# Patient Record
Sex: FEMALE | URBAN - METROPOLITAN AREA
[De-identification: names, ages, dates, MRNs, and addresses within clinical notes are randomized per-mention and may not be internally consistent; named-entity substitution may affect disease eponyms.]

---

## 2024-01-29 LAB
EXTERNAL HEPATITIS B SURFACE ANTIGEN: NEGATIVE
EXTERNAL RUBELLA IGG QUANTITATION: NEGATIVE
HEPATITIS C VIRUS AB PRESENCE IN SERUM EXTERNAL: NONREACTIVE
HIV 1/ 2 AG/AB SCREEN EXTERNAL: NONREACTIVE
SYPHILIS TOTAL AB EXTERNAL: NONREACTIVE

## 2024-01-29 PROCEDURE — 86317 IMMUNOASSAY INFECTIOUS AGENT: CPT

## 2024-01-29 PROCEDURE — 87800 DETECT AGNT MULT DNA DIREC: CPT

## 2024-01-29 PROCEDURE — 87389 HIV-1 AG W/HIV-1&-2 AB AG IA: CPT

## 2024-01-29 PROCEDURE — 86803 HEPATITIS C AB TEST: CPT

## 2024-01-29 PROCEDURE — 86780 TREPONEMA PALLIDUM: CPT

## 2024-01-29 PROCEDURE — 80307 DRUG TEST PRSMV CHEM ANLYZR: CPT

## 2024-01-29 PROCEDURE — 88175 CYTOPATH C/V AUTO FLUID REDO: CPT

## 2024-01-29 PROCEDURE — 86901 BLOOD TYPING SEROLOGIC RH(D): CPT

## 2024-01-29 PROCEDURE — 87086 URINE CULTURE/COLONY COUNT: CPT

## 2024-01-29 PROCEDURE — 85027 COMPLETE CBC AUTOMATED: CPT

## 2024-01-29 PROCEDURE — 86850 RBC ANTIBODY SCREEN: CPT

## 2024-01-29 PROCEDURE — 86900 BLOOD TYPING SEROLOGIC ABO: CPT

## 2024-01-29 PROCEDURE — 87340 HEPATITIS B SURFACE AG IA: CPT

## 2024-01-30 ENCOUNTER — LAB REQUISITION (OUTPATIENT)
Dept: LAB | Facility: HOSPITAL | Age: 24
End: 2024-01-30

## 2024-01-30 DIAGNOSIS — Z3A.10 10 WEEKS GESTATION OF PREGNANCY (HHS-HCC): ICD-10-CM

## 2024-01-30 LAB
AMPHETAMINES UR QL SCN: NORMAL
BARBITURATES UR QL SCN: NORMAL
BENZODIAZ UR QL SCN: NORMAL
BZE UR QL SCN: NORMAL
CANNABINOIDS UR QL SCN: NORMAL
ERYTHROCYTE [DISTWIDTH] IN BLOOD BY AUTOMATED COUNT: 12.9 % (ref 11.5–14.5)
FENTANYL+NORFENTANYL UR QL SCN: NORMAL
HBV SURFACE AG SERPL QL IA: NONREACTIVE
HCT VFR BLD AUTO: 40.6 % (ref 36–46)
HCV AB SER QL: NONREACTIVE
HGB BLD-MCNC: 13.4 G/DL (ref 12–16)
HIV 1+2 AB+HIV1 P24 AG SERPL QL IA: NONREACTIVE
MCH RBC QN AUTO: 28.8 PG (ref 26–34)
MCHC RBC AUTO-ENTMCNC: 33 G/DL (ref 32–36)
MCV RBC AUTO: 87 FL (ref 80–100)
NRBC BLD-RTO: 0 /100 WBCS (ref 0–0)
OPIATES UR QL SCN: NORMAL
OXYCODONE+OXYMORPHONE UR QL SCN: NORMAL
PCP UR QL SCN: NORMAL
PLATELET # BLD AUTO: 269 X10*3/UL (ref 150–450)
RBC # BLD AUTO: 4.65 X10*6/UL (ref 4–5.2)
TREPONEMA PALLIDUM IGG+IGM AB [PRESENCE] IN SERUM OR PLASMA BY IMMUNOASSAY: NONREACTIVE
WBC # BLD AUTO: 9.5 X10*3/UL (ref 4.4–11.3)

## 2024-01-31 ENCOUNTER — LAB REQUISITION (OUTPATIENT)
Dept: LAB | Facility: HOSPITAL | Age: 24
End: 2024-01-31

## 2024-01-31 DIAGNOSIS — Z12.4 ENCOUNTER FOR SCREENING FOR MALIGNANT NEOPLASM OF CERVIX: ICD-10-CM

## 2024-01-31 LAB
ABO GROUP (TYPE) IN BLOOD: NORMAL
ANTIBODY SCREEN: NORMAL
BACTERIA UR CULT: ABNORMAL
C TRACH RRNA SPEC QL NAA+PROBE: NEGATIVE
N GONORRHOEA DNA SPEC QL PROBE+SIG AMP: NEGATIVE
REFLEX ADDED, ANEMIA PANEL: NORMAL
RH FACTOR (ANTIGEN D): NORMAL
RUBV IGG SERPL IA-ACNC: 0.7 IA
RUBV IGG SERPL QL IA: NEGATIVE

## 2024-02-11 LAB
CYTOLOGY CMNT CVX/VAG CYTO-IMP: NORMAL
LAB AP HPV GENOTYPE QUESTION: NO
LAB AP HPV HR: NORMAL
LABORATORY COMMENT REPORT: NORMAL
LMP START DATE: NORMAL
MENSTRUAL HX REPORTED: NORMAL
PATH REPORT.TOTAL CANCER: NORMAL

## 2024-03-01 ENCOUNTER — INITIAL PRENATAL (OUTPATIENT)
Dept: OBSTETRICS AND GYNECOLOGY | Facility: CLINIC | Age: 24
End: 2024-03-01
Payer: COMMERCIAL

## 2024-03-01 ENCOUNTER — APPOINTMENT (OUTPATIENT)
Dept: OBSTETRICS AND GYNECOLOGY | Facility: CLINIC | Age: 24
End: 2024-03-01
Payer: COMMERCIAL

## 2024-03-01 VITALS
BODY MASS INDEX: 41.38 KG/M2 | DIASTOLIC BLOOD PRESSURE: 78 MMHG | WEIGHT: 242.38 LBS | SYSTOLIC BLOOD PRESSURE: 120 MMHG | HEIGHT: 64 IN

## 2024-03-01 DIAGNOSIS — O26.22 RECURRENT PREGNANCY LOSS IN PREGNANT PATIENT IN SECOND TRIMESTER, ANTEPARTUM (HHS-HCC): ICD-10-CM

## 2024-03-01 DIAGNOSIS — Z34.92 SECOND TRIMESTER PREGNANCY (HHS-HCC): Primary | ICD-10-CM

## 2024-03-01 DIAGNOSIS — Z3A.15 15 WEEKS GESTATION OF PREGNANCY (HHS-HCC): ICD-10-CM

## 2024-03-01 PROCEDURE — H1000 PRENATAL CARE ATRISK ASSESSM: HCPCS | Performed by: ADVANCED PRACTICE MIDWIFE

## 2024-03-01 PROCEDURE — 99214 OFFICE O/P EST MOD 30 MIN: CPT | Performed by: ADVANCED PRACTICE MIDWIFE

## 2024-03-01 ASSESSMENT — EDINBURGH POSTNATAL DEPRESSION SCALE (EPDS)
THINGS HAVE BEEN GETTING ON TOP OF ME: NO, MOST OF THE TIME I HAVE COPED QUITE WELL
I HAVE LOOKED FORWARD WITH ENJOYMENT TO THINGS: AS MUCH AS I EVER DID
THE THOUGHT OF HARMING MYSELF HAS OCCURRED TO ME: NEVER
I HAVE FELT SCARED OR PANICKY FOR NO GOOD REASON: NO, NOT MUCH
I HAVE FELT SAD OR MISERABLE: NOT VERY OFTEN
I HAVE BEEN ABLE TO LAUGH AND SEE THE FUNNY SIDE OF THINGS: AS MUCH AS I ALWAYS COULD
I HAVE BLAMED MYSELF UNNECESSARILY WHEN THINGS WENT WRONG: YES, SOME OF THE TIME
I HAVE BEEN ANXIOUS OR WORRIED FOR NO GOOD REASON: YES, SOMETIMES
I HAVE BEEN SO UNHAPPY THAT I HAVE HAD DIFFICULTY SLEEPING: NOT AT ALL
I HAVE BEEN SO UNHAPPY THAT I HAVE BEEN CRYING: ONLY OCCASIONALLY
TOTAL SCORE: 8

## 2024-03-01 NOTE — PROGRESS NOTES
New OB    Pt presents for NOB visit. Past patient x 2 pregnancies    This was a planned pregnancy; care started at [facility] secondary to insurance  Complaints today: fatigue    Employment: [employer]   OB Hx:      Feels safe at home: Yes.    PE:        Constitutional: Alert and oriented, cooperative, no acute distress, well nourished, well hydrated       HEENT: normal       Chest: respirations unlaboured        Abd soft NT + fht      Peripheral Vascular: no edema or varicosities      Neurologic: normal tone and sensation     Neuropsych: normal affect, well groomed, good eye contact    Routine PNC, patient appropriate for and desires midwifery service.    Discussed routine OB labs, all normal  Rubella nonimmune  Dating per LMP  Education provided r/t nutrition, folic acid supplementation, dietary guidelines, exercise, smoking, alcohol, caffeine, and drug use.  Healthy weight gain in pregnancy discussed. Encouraged daily exercise and meditation.  BMI 42  Does not meet criteria for ASA therapy    Current Meds: PNV   Hx Depression/Anxiety: yes in counseling, no meds    RTC 4 wks/prn  review  U/S

## 2024-03-11 ENCOUNTER — ROUTINE PRENATAL (OUTPATIENT)
Dept: OBSTETRICS AND GYNECOLOGY | Facility: CLINIC | Age: 24
End: 2024-03-11
Payer: COMMERCIAL

## 2024-03-11 VITALS
BODY MASS INDEX: 40.23 KG/M2 | SYSTOLIC BLOOD PRESSURE: 104 MMHG | WEIGHT: 234.38 LBS | DIASTOLIC BLOOD PRESSURE: 74 MMHG

## 2024-03-11 DIAGNOSIS — Z3A.16 16 WEEKS GESTATION OF PREGNANCY (HHS-HCC): ICD-10-CM

## 2024-03-11 DIAGNOSIS — O99.212 OBESITY AFFECTING PREGNANCY IN SECOND TRIMESTER, UNSPECIFIED OBESITY TYPE (HHS-HCC): ICD-10-CM

## 2024-03-11 DIAGNOSIS — O46.92 VAGINAL BLEEDING IN PREGNANCY, SECOND TRIMESTER (HHS-HCC): Primary | ICD-10-CM

## 2024-03-11 DIAGNOSIS — Z34.82 NORMAL PREGNANCY IN MULTIGRAVIDA IN SECOND TRIMESTER (HHS-HCC): ICD-10-CM

## 2024-03-11 PROCEDURE — 99213 OFFICE O/P EST LOW 20 MIN: CPT | Performed by: ADVANCED PRACTICE MIDWIFE

## 2024-03-11 NOTE — PROGRESS NOTES
Return OB visit    S: Anya De Anda is a 23 y.o.  at 16w6d with a working estimated date of delivery of 2024, by Last Menstrual Period who presents for a routine prenatal visit. She denies vaginal bleeding, abdominal pain, leakage of fluid.     Concerns today: Patient having vaginal spotting   Started and stopped on Saturday (2 days ago)  Patient having cramping  Denies abnormal vaginal discharge, itching or burning or odor  Denies intercourse or straining close to incident  Reports hx of ultrasound showing established pregnancy (outside )    O: See prenatal flow sheet  Declines vaginal exam    A/P:  Spotting in early second trimester  Reassurance of + FHTs  Reviewed warning signs and when to call midwife  Follow up in 2 weeks for a routine prenatal visit

## 2024-03-12 PROBLEM — O99.212 OBESITY AFFECTING PREGNANCY IN SECOND TRIMESTER (HHS-HCC): Status: ACTIVE | Noted: 2024-03-12

## 2024-03-12 PROBLEM — O46.92 VAGINAL BLEEDING IN PREGNANCY, SECOND TRIMESTER (HHS-HCC): Status: ACTIVE | Noted: 2024-03-12

## 2024-03-28 ENCOUNTER — HOSPITAL ENCOUNTER (OUTPATIENT)
Dept: RADIOLOGY | Facility: CLINIC | Age: 24
Discharge: HOME | End: 2024-03-28
Payer: COMMERCIAL

## 2024-03-28 DIAGNOSIS — Z34.92 SECOND TRIMESTER PREGNANCY (HHS-HCC): ICD-10-CM

## 2024-03-28 PROCEDURE — 76811 OB US DETAILED SNGL FETUS: CPT

## 2024-03-28 PROCEDURE — 76811 OB US DETAILED SNGL FETUS: CPT | Performed by: OBSTETRICS & GYNECOLOGY

## 2024-03-29 ENCOUNTER — ROUTINE PRENATAL (OUTPATIENT)
Dept: OBSTETRICS AND GYNECOLOGY | Facility: CLINIC | Age: 24
End: 2024-03-29
Payer: COMMERCIAL

## 2024-03-29 VITALS — SYSTOLIC BLOOD PRESSURE: 108 MMHG | WEIGHT: 243 LBS | BODY MASS INDEX: 41.71 KG/M2 | DIASTOLIC BLOOD PRESSURE: 76 MMHG

## 2024-03-29 DIAGNOSIS — Z3A.19 19 WEEKS GESTATION OF PREGNANCY (HHS-HCC): Primary | ICD-10-CM

## 2024-03-29 DIAGNOSIS — O99.212 OBESITY AFFECTING PREGNANCY IN SECOND TRIMESTER, UNSPECIFIED OBESITY TYPE (HHS-HCC): ICD-10-CM

## 2024-03-29 DIAGNOSIS — Z34.92: ICD-10-CM

## 2024-03-29 PROCEDURE — 0501F PRENATAL FLOW SHEET: CPT

## 2024-03-29 NOTE — PROGRESS NOTES
Patient being seen for routine OB visit.   No concerns for today    STEW Olsen     Anya De Anda is a 23 y.o.  at 19w3d with a working estimated date of delivery of 2024, by Last Menstrual Period who presents for a routine prenatal visit. She denies vaginal bleeding, leakage of fluid, or contractions. Patient reports feeling fetal movement and compliance with PNV.     Anatomy scan reviewed and the following structures were not visualized: RVOT, LVOT, 3VV, patient will return in two weeks to complete anatomy evaluation. No malformations were noted.   Current Outpatient Medications on File Prior to Visit   Medication Sig Dispense Refill    PNV no.95/ferrous fum/folic ac (PRENATAL ORAL) Take by mouth once daily.       No current facility-administered medications on file prior to visit.        Her pregnancy is complicated by:  Medical Problems       Problem List       Encounter for supervision of low-risk pregnancy in second trimester    Overview Signed 3/1/2024 10:38 AM by MAKENNA Johnson     Starting BMI 42  Rubella nonimmune         Vaginal bleeding in pregnancy, second trimester    Obesity affecting pregnancy in second trimester           Objective   Weight: 110 kg (243 lb)  Expected Total Weight Gain: 5 kg (11 lb)-9 kg (19 lb)   TWG: -3.175 kg (-7 lb)  Pregravid BMI: 42.89      BP: 108/76          Prenatal Labs:  Lab Results   Component Value Date    HGB 13.4 2024    HCT 40.6 2024     2024    ABO O 2024    LABRH POS 2024    NEISSGONOAMP Negative 2024    CHLAMTRACAMP Negative 2024    SYPHT Nonreactive 2024    HEPBSAG Nonreactive 2024    HIV1X2 Nonreactive 2024    URINECULTURE (A) 2024     Multiple organisms present, probable contamination. Repeat culture if clinically indicated.       Assessment/Plan   Continue PNV  Follow up in 4 weeks for a routine prenatal visit.    MAKENNA Garcia

## 2024-04-15 ENCOUNTER — APPOINTMENT (OUTPATIENT)
Dept: RADIOLOGY | Facility: CLINIC | Age: 24
End: 2024-04-15
Payer: COMMERCIAL

## 2024-04-23 ENCOUNTER — TELEPHONE (OUTPATIENT)
Dept: OBSTETRICS AND GYNECOLOGY | Facility: CLINIC | Age: 24
End: 2024-04-23
Payer: COMMERCIAL

## 2024-04-23 NOTE — TELEPHONE ENCOUNTER
----- Message from Sayda Dinh APRN-CNM sent at 4/23/2024  8:41 AM EDT -----  Please schedule her OB appts out:  After 6/7 appt she needs 3 appts every 2 weeks  Followed by weekly x 6

## 2024-04-25 ENCOUNTER — HOSPITAL ENCOUNTER (OUTPATIENT)
Dept: RADIOLOGY | Facility: CLINIC | Age: 24
Discharge: HOME | End: 2024-04-25
Payer: COMMERCIAL

## 2024-04-25 DIAGNOSIS — Z34.92 SECOND TRIMESTER PREGNANCY (HHS-HCC): ICD-10-CM

## 2024-04-25 PROCEDURE — 76816 OB US FOLLOW-UP PER FETUS: CPT | Performed by: STUDENT IN AN ORGANIZED HEALTH CARE EDUCATION/TRAINING PROGRAM

## 2024-04-25 PROCEDURE — 76816 OB US FOLLOW-UP PER FETUS: CPT

## 2024-04-26 ENCOUNTER — ROUTINE PRENATAL (OUTPATIENT)
Dept: OBSTETRICS AND GYNECOLOGY | Facility: CLINIC | Age: 24
End: 2024-04-26
Payer: COMMERCIAL

## 2024-04-26 VITALS — DIASTOLIC BLOOD PRESSURE: 72 MMHG | BODY MASS INDEX: 42.28 KG/M2 | SYSTOLIC BLOOD PRESSURE: 128 MMHG | WEIGHT: 246.3 LBS

## 2024-04-26 DIAGNOSIS — O46.92 VAGINAL BLEEDING IN PREGNANCY, SECOND TRIMESTER (HHS-HCC): ICD-10-CM

## 2024-04-26 DIAGNOSIS — O99.212 OBESITY AFFECTING PREGNANCY IN SECOND TRIMESTER, UNSPECIFIED OBESITY TYPE (HHS-HCC): ICD-10-CM

## 2024-04-26 DIAGNOSIS — Z34.92: ICD-10-CM

## 2024-04-26 DIAGNOSIS — Z3A.23 23 WEEKS GESTATION OF PREGNANCY (HHS-HCC): ICD-10-CM

## 2024-04-26 DIAGNOSIS — Z13.1 SCREENING FOR DIABETES MELLITUS: Primary | ICD-10-CM

## 2024-04-26 PROCEDURE — 0501F PRENATAL FLOW SHEET: CPT | Performed by: ADVANCED PRACTICE MIDWIFE

## 2024-04-26 NOTE — PROGRESS NOTES
Subjective   Patient ID [id number]   Anya De Anda is a 23 y.o.  at 23w3d with a working estimated date of delivery of 2024, by Last Menstrual Period who presents for a routine prenatal visit. She denies vaginal bleeding, contractions or leaking of fluid.     Her pregnancy is complicated by: Obesity      Objective   Physical Exam:     Constitutional: Alert and oriented, cooperative, no acute distress, well nourished, well hydrated     HEENT: normal     OB:  fundus at 32 cm    Neuropsych: normal affect, well groomed, good eye contact           , Pregravid BMI: 42.89  Expected Total Weight Gain: 5 kg (11 lb)-9 kg (19 lb)     Assessment/Plan   Encouraged exercise and meditation  Continue prenatal vitamin.  GTT, CBC  prior to NV  Encourage prenatal education including birth classes , videos, apps in pregnancy, meditation and hypnobirthing  Reviewed ultrasound results normal  Follow up in 4 weeks for a routine prenatal visit.

## 2024-05-22 ENCOUNTER — LAB (OUTPATIENT)
Dept: LAB | Facility: LAB | Age: 24
End: 2024-05-22
Payer: COMMERCIAL

## 2024-05-22 DIAGNOSIS — Z13.1 SCREENING FOR DIABETES MELLITUS: ICD-10-CM

## 2024-05-22 LAB
ERYTHROCYTE [DISTWIDTH] IN BLOOD BY AUTOMATED COUNT: 13.7 % (ref 11.5–14.5)
GLUCOSE 1H P 50 G GLC PO SERPL-MCNC: 138 MG/DL
HCT VFR BLD AUTO: 35.8 % (ref 36–46)
HGB BLD-MCNC: 11.9 G/DL (ref 12–16)
MCH RBC QN AUTO: 29.2 PG (ref 26–34)
MCHC RBC AUTO-ENTMCNC: 33.2 G/DL (ref 32–36)
MCV RBC AUTO: 88 FL (ref 80–100)
NRBC BLD-RTO: 0 /100 WBCS (ref 0–0)
PLATELET # BLD AUTO: 247 X10*3/UL (ref 150–450)
RBC # BLD AUTO: 4.08 X10*6/UL (ref 4–5.2)
REFLEX ADDED, ANEMIA PANEL: NORMAL
WBC # BLD AUTO: 9.7 X10*3/UL (ref 4.4–11.3)

## 2024-05-22 PROCEDURE — 36415 COLL VENOUS BLD VENIPUNCTURE: CPT

## 2024-05-22 PROCEDURE — 85027 COMPLETE CBC AUTOMATED: CPT

## 2024-05-22 PROCEDURE — 82947 ASSAY GLUCOSE BLOOD QUANT: CPT

## 2024-05-27 PROBLEM — O99.011 ANEMIA AFFECTING PREGNANCY IN FIRST TRIMESTER (HHS-HCC): Status: ACTIVE | Noted: 2024-05-27

## 2024-06-07 ENCOUNTER — APPOINTMENT (OUTPATIENT)
Dept: OBSTETRICS AND GYNECOLOGY | Facility: CLINIC | Age: 24
End: 2024-06-07
Payer: COMMERCIAL

## 2024-06-12 ENCOUNTER — HOSPITAL ENCOUNTER (OUTPATIENT)
Dept: RADIOLOGY | Facility: CLINIC | Age: 24
Discharge: HOME | End: 2024-06-12
Payer: COMMERCIAL

## 2024-06-12 DIAGNOSIS — Z34.92 SECOND TRIMESTER PREGNANCY (HHS-HCC): ICD-10-CM

## 2024-06-12 DIAGNOSIS — O36.5930 MATERNAL CARE FOR OTHER KNOWN OR SUSPECTED POOR FETAL GROWTH, THIRD TRIMESTER, NOT APPLICABLE OR UNSPECIFIED (HHS-HCC): ICD-10-CM

## 2024-06-12 PROCEDURE — 76816 OB US FOLLOW-UP PER FETUS: CPT | Performed by: OBSTETRICS & GYNECOLOGY

## 2024-06-12 PROCEDURE — 76816 OB US FOLLOW-UP PER FETUS: CPT

## 2024-06-12 PROCEDURE — 76819 FETAL BIOPHYS PROFIL W/O NST: CPT

## 2024-06-12 PROCEDURE — 76819 FETAL BIOPHYS PROFIL W/O NST: CPT | Performed by: OBSTETRICS & GYNECOLOGY

## 2024-06-20 ENCOUNTER — APPOINTMENT (OUTPATIENT)
Dept: OBSTETRICS AND GYNECOLOGY | Facility: CLINIC | Age: 24
End: 2024-06-20
Payer: COMMERCIAL

## 2024-06-20 VITALS — BODY MASS INDEX: 43.86 KG/M2 | SYSTOLIC BLOOD PRESSURE: 104 MMHG | DIASTOLIC BLOOD PRESSURE: 72 MMHG | WEIGHT: 255.5 LBS

## 2024-06-20 DIAGNOSIS — Z23 NEED FOR TDAP VACCINATION: ICD-10-CM

## 2024-06-20 PROBLEM — R05.8 COUGH ON EXERCISE: Status: ACTIVE | Noted: 2024-06-20

## 2024-06-20 PROBLEM — L40.9 PSORIASIS: Status: ACTIVE | Noted: 2024-06-20

## 2024-06-20 PROBLEM — R55 VASOVAGAL SYNCOPE: Status: ACTIVE | Noted: 2024-06-20

## 2024-06-20 PROBLEM — F32.A DEPRESSIVE DISORDER: Status: ACTIVE | Noted: 2024-06-20

## 2024-06-20 PROBLEM — J45.909 ASTHMA (HHS-HCC): Status: ACTIVE | Noted: 2024-06-20

## 2024-06-20 PROCEDURE — 90471 IMMUNIZATION ADMIN: CPT | Performed by: ADVANCED PRACTICE MIDWIFE

## 2024-06-20 PROCEDURE — 90715 TDAP VACCINE 7 YRS/> IM: CPT | Performed by: ADVANCED PRACTICE MIDWIFE

## 2024-06-20 PROCEDURE — 0501F PRENATAL FLOW SHEET: CPT | Performed by: ADVANCED PRACTICE MIDWIFE

## 2024-06-20 RX ORDER — ALBUTEROL SULFATE 90 UG/1
AEROSOL, METERED RESPIRATORY (INHALATION)
COMMUNITY
Start: 2020-10-14

## 2024-06-20 NOTE — PROGRESS NOTES
Subjective     Anya De Anda is a 24 y.o.  at 31w2d with a working estimated date of delivery of 2024, by Last Menstrual Period who presents for a routine prenatal visit. Endorses good fetal movement, denies vaginal bleeding, leakage of fluid, or regular contractions.  Has not been seen since 23 wks. Encouraged pt to adhere to the rest of her scheduled visits.   Reviewed labs, no anemia, failed 1 hr. Plans to complete 3 hr GTT next .  Discussed TDAP, agrees to administration today.  Discussed NSTs weekly at 34 weeks r/t obesity, verbalizes understanding.     Her pregnancy is complicated by:  Pregnancy Problems (from 24 to present)       Problem Noted Resolved    Anemia affecting pregnancy in first trimester ([facility]) 2024 by MAKENNA Johnson No    Priority:  Medium      Overview Signed 2024  3:16 PM by MAKENNA Johnson     Initial hgb 11.7         Vaginal bleeding in pregnancy, second trimester ([facility]) 3/12/2024 by MAKENNA Cavazos No    Priority:  Medium      Obesity affecting pregnancy in second trimester ([facility]) 3/12/2024 by MAKENNA Cavazos No    Priority:  Medium      Encounter for supervision of low-risk pregnancy in second trimester ([facility]) 3/1/2024 by MAKENNA Johnson No    Priority:  Medium      Overview Addendum 2024 11:02 AM by MAKENNA Johnson     Elevated 1 hr gtt  Starting BMI 42  Rubella nonimmune  Mild Poly 24.4 at 30 weeks repeat at 36 weeks          Objective   Physical Exam:   Weight: 116 kg (255 lb 8 oz)  TW.495 kg (5 lb 8 oz)  Expected Total Weight Gain: 5 kg (11 lb)-9 kg (19 lb)   Pregravid BMI: 42.89  BP: 104/72  Fetal Heart Rate: 138 Fundal Height (cm): 34 cm             Postpartum Depression: Medium Risk (3/1/2024)    Oxford  Depression Scale     Last EPDS Total Score: 8     Last EPDS Self Harm Result: Never        Prenatal Labs  Lab Results   Component Value  Date    HGB 11.9 (L) 2024    HCT 35.8 (L) 2024     2024    ABO O 2024    LABRH POS 2024    NEISSGONOAMP Negative 2024    CHLAMTRACAMP Negative 2024    SYPHT Nonreactive 2024    HEPBSAG Nonreactive 2024    HIV1X2 Nonreactive 2024    URINECULTURE (A) 2024     Multiple organisms present, probable contamination. Repeat culture if clinically indicated.     Lab Results   Component Value Date    GLUC1P 138 (H) 2024       Assessment/Plan   24 y.o.  at 31w2d  Continue prenatal vitamins  Reviewed recommended vaccinations in pregnancy, Accepts TDAP vaccine    Reviewed s/sx of PTL, warning signs, fetal movement counts, and when to call provider    Follow up in 2 week(s) for a routine prenatal visit or sooner as needed.    DEIRDRE Jones-RAVIN

## 2024-06-26 ENCOUNTER — LAB (OUTPATIENT)
Dept: LAB | Facility: LAB | Age: 24
End: 2024-06-26
Payer: COMMERCIAL

## 2024-06-26 DIAGNOSIS — Z13.1 SCREENING FOR DIABETES MELLITUS: ICD-10-CM

## 2024-06-26 LAB
GLUCOSE 1H P 100 G GLC PO SERPL-MCNC: 169 MG/DL
GLUCOSE 2H P 100 G GLC PO SERPL-MCNC: 109 MG/DL
GLUCOSE 3H P 100 G GLC PO SERPL-MCNC: 127 MG/DL
GLUCOSE P FAST SERPL-MCNC: 83 MG/DL

## 2024-06-26 PROCEDURE — 82952 GTT-ADDED SAMPLES: CPT

## 2024-06-26 PROCEDURE — 82951 GLUCOSE TOLERANCE TEST (GTT): CPT

## 2024-06-26 PROCEDURE — 82950 GLUCOSE TEST: CPT

## 2024-06-26 PROCEDURE — 82947 ASSAY GLUCOSE BLOOD QUANT: CPT

## 2024-06-26 PROCEDURE — 36415 COLL VENOUS BLD VENIPUNCTURE: CPT

## 2024-07-03 ENCOUNTER — HOSPITAL ENCOUNTER (OUTPATIENT)
Dept: RADIOLOGY | Facility: CLINIC | Age: 24
Discharge: HOME | End: 2024-07-03
Payer: COMMERCIAL

## 2024-07-03 DIAGNOSIS — Z34.92 SECOND TRIMESTER PREGNANCY (HHS-HCC): ICD-10-CM

## 2024-07-03 DIAGNOSIS — O40.3XX0 POLYHYDRAMNIOS AFFECTING PREGNANCY IN THIRD TRIMESTER (HHS-HCC): ICD-10-CM

## 2024-07-03 DIAGNOSIS — O99.213 OBESITY AFFECTING PREGNANCY IN THIRD TRIMESTER (HHS-HCC): ICD-10-CM

## 2024-07-03 PROCEDURE — 76816 OB US FOLLOW-UP PER FETUS: CPT

## 2024-07-03 PROCEDURE — 76819 FETAL BIOPHYS PROFIL W/O NST: CPT

## 2024-07-03 PROCEDURE — 76816 OB US FOLLOW-UP PER FETUS: CPT | Performed by: OBSTETRICS & GYNECOLOGY

## 2024-07-03 PROCEDURE — 76819 FETAL BIOPHYS PROFIL W/O NST: CPT | Performed by: OBSTETRICS & GYNECOLOGY

## 2024-07-05 ENCOUNTER — PREP FOR PROCEDURE (OUTPATIENT)
Dept: OBSTETRICS AND GYNECOLOGY | Facility: CLINIC | Age: 24
End: 2024-07-05

## 2024-07-05 ENCOUNTER — APPOINTMENT (OUTPATIENT)
Dept: OBSTETRICS AND GYNECOLOGY | Facility: CLINIC | Age: 24
End: 2024-07-05
Payer: COMMERCIAL

## 2024-07-05 VITALS — SYSTOLIC BLOOD PRESSURE: 122 MMHG | BODY MASS INDEX: 44.29 KG/M2 | DIASTOLIC BLOOD PRESSURE: 79 MMHG | WEIGHT: 258 LBS

## 2024-07-05 DIAGNOSIS — O99.011 ANEMIA AFFECTING PREGNANCY IN FIRST TRIMESTER (HHS-HCC): ICD-10-CM

## 2024-07-05 DIAGNOSIS — O99.212 OBESITY AFFECTING PREGNANCY IN SECOND TRIMESTER, UNSPECIFIED OBESITY TYPE (HHS-HCC): Primary | ICD-10-CM

## 2024-07-05 DIAGNOSIS — Z3A.33 33 WEEKS GESTATION OF PREGNANCY (HHS-HCC): ICD-10-CM

## 2024-07-05 DIAGNOSIS — Z34.92: ICD-10-CM

## 2024-07-05 PROCEDURE — 0501F PRENATAL FLOW SHEET: CPT | Performed by: ADVANCED PRACTICE MIDWIFE

## 2024-07-05 NOTE — PROGRESS NOTES
Subjective   Patient ID [id number]   Anya De Anda is a 24 y.o.  at 33w3d with a working estimated date of delivery of 2024, by Last Menstrual Period who presents for a routine prenatal visit. She denies vaginal bleeding, contractions or leaking of fluid.  1 hr elevated 3 hr gtt normal    Objective   Physical Exam:     Constitutional: Alert and oriented, cooperative, no acute distress, well nourished, well hydrated     HEENT: normal     OB:  fundus at 38 cm    Neuropsych: normal affect, well groomed, good eye contact           , Pregravid BMI: 42.89  Expected Total Weight Gain: 5 kg (11 lb)-9 kg (19 lb)     Assessment/Plan   Poly hydramnios SONIA 28@ 32 weeks EFW 97%  IOL at 39 weeks reviewed written information given, scheduled for  at 8 PM  Continue prenatal vitamin.  Review 28 week folder sign hospital consent    labor signs and symptoms reviewed, discuss [facility] level 1 if any admission necessary prior to 35 weeks will need to go to [facility] main campus  Inquire about birthing plans (epidural, NCB, HBC, Room 8, nitrous)    Follow up in 2 weeks for a routine prenatal visit.

## 2024-07-10 ENCOUNTER — APPOINTMENT (OUTPATIENT)
Dept: OBSTETRICS AND GYNECOLOGY | Facility: CLINIC | Age: 24
End: 2024-07-10
Payer: COMMERCIAL

## 2024-07-10 VITALS — WEIGHT: 258 LBS | DIASTOLIC BLOOD PRESSURE: 71 MMHG | BODY MASS INDEX: 44.29 KG/M2 | SYSTOLIC BLOOD PRESSURE: 104 MMHG

## 2024-07-10 DIAGNOSIS — Z3A.34 34 WEEKS GESTATION OF PREGNANCY (HHS-HCC): Primary | ICD-10-CM

## 2024-07-10 DIAGNOSIS — O99.213 OBESITY AFFECTING PREGNANCY IN THIRD TRIMESTER, UNSPECIFIED OBESITY TYPE (HHS-HCC): ICD-10-CM

## 2024-07-10 PROCEDURE — 59025 FETAL NON-STRESS TEST: CPT

## 2024-07-10 PROCEDURE — 0501F PRENATAL FLOW SHEET: CPT

## 2024-07-10 NOTE — PROGRESS NOTES
Subjective     Anya De Anda is a 24 y.o.  at 34w1d with a working estimated date of delivery of 2024, by Last Menstrual Period who presents for a routine prenatal visit. She denies vaginal bleeding, leakage of fluid, decreased fetal movements, or contractions.    Her pregnancy is complicated by:  Medical Problems       Problem List       Encounter for supervision of low-risk pregnancy in second trimester ([facility])    Overview Addendum 2024 11:26 AM by MAKENNA Johnson     Elevated 1 hr gtt  Starting BMI 42  Rubella nonimmune  Poly 28.0 at 32 weeks  repeat at 36 weeks  Weekly NST after 34 weeks   IOL at 39 weeks         Vaginal bleeding in pregnancy, second trimester ([facility])    Obesity affecting pregnancy in second trimester ([facility])    Overview Signed 2024 12:46 PM by MAKENNA Jones     Weekly NSTs at 34 weeks  Growth U/S at 30 & 36 weeks  IOL 39.0-39.6         Anemia affecting pregnancy in first trimester ([facility])    Overview Signed 2024  3:16 PM by MAKENNA Johnson     Initial hgb 11.7         Asthma ([facility])    Vasovagal syncope    Psoriasis    Depressive disorder    Cough on exercise          Objective   Weight: 117 kg (258 lb)  TWG: 3.629 kg (8 lb)  Pregravid BMI: 42.89    BP: 104/71  Fetal Heart Rate: NST       Prenatal Labs:  Lab Results   Component Value Date    HGB 11.9 (L) 2024    HCT 35.8 (L) 2024     2024    ABO O 2024    LABRH POS 2024    NEISSGONOAMP Negative 2024    CHLAMTRACAMP Negative 2024    SYPHT Nonreactive 2024    HEPBSAG Nonreactive 2024    HIV1X2 Nonreactive 2024    URINECULTURE (A) 2024     Multiple organisms present, probable contamination. Repeat culture if clinically indicated.       Assessment/Plan   Weekly Nst's for BMI, reactive  Consent obtained today  Reviewed warning signs, fetal movement counts, and when to call provider  Follow up in 1 week  for a routine prenatal visit.    MAKENNA Garcia

## 2024-07-10 NOTE — PROCEDURES
Anya De Anda, a  at 34w1d with an MAYELIN of 2024, by Last Menstrual Period, was seen at [facility] for a nonstress test.    Non-Stress Test   Baseline Fetal Heart Rate for Non-Stress Test: 125 BPM  Variability in Waveform for Non-Stress Test: Moderate  Accelerations in Non-Stress Test: Yes, greater than/equal to 15 bpm, lasting at least 15 seconds  Decelerations in Non-Stress Test: None  Contractions in Non-Stress Test: Not present  Acoustic Stimulator for Non-Stress Test: No  Interpretation of Non-Stress Test   Interpretation of Non-Stress Test: Reactive

## 2024-07-17 ENCOUNTER — APPOINTMENT (OUTPATIENT)
Dept: OBSTETRICS AND GYNECOLOGY | Facility: CLINIC | Age: 24
End: 2024-07-17
Payer: COMMERCIAL

## 2024-07-19 ENCOUNTER — APPOINTMENT (OUTPATIENT)
Dept: OBSTETRICS AND GYNECOLOGY | Facility: CLINIC | Age: 24
End: 2024-07-19
Payer: COMMERCIAL

## 2024-07-25 ENCOUNTER — HOSPITAL ENCOUNTER (OUTPATIENT)
Dept: RADIOLOGY | Facility: CLINIC | Age: 24
Discharge: HOME | End: 2024-07-25
Payer: COMMERCIAL

## 2024-07-25 DIAGNOSIS — O99.213 OBESITY COMPLICATING PREGNANCY, THIRD TRIMESTER (HHS-HCC): ICD-10-CM

## 2024-07-25 DIAGNOSIS — O36.63X1: ICD-10-CM

## 2024-07-25 DIAGNOSIS — Z34.92 SECOND TRIMESTER PREGNANCY (HHS-HCC): ICD-10-CM

## 2024-07-25 NOTE — PROGRESS NOTES
Subjective   Patient ID [id number]   Anya De Anda is a 24 y.o.  at 36w2d with a working estimated date of delivery of 2024, by Last Menstrual Period who presents for a routine prenatal visit. She denies vaginal bleeding, contractions or leaking of fluid.    No complaints today. NST began @0920 fht 145 baseline + accels no decels no ctx    Objective   Physical Exam:     Constitutional: Alert and oriented, cooperative, no acute distress, well nourished, well hydrated     HEENT: normal     OB:  VE 50/-3    Neuropsych: normal affect, well groomed, good eye contact           , Pregravid BMI: 42.89  Expected Total Weight Gain: 5 kg (11 lb)-9 kg (19 lb)       Assessment/Plan   Continue prenatal vitamin  Desires NCB  GBS done. Reviewed signs of labor including contractions. Leaking of fluid, vaginal bleeding . Pt aware to call for signs of labor and or  decreased fetal movement. Aware to call answering service prior to heading to the hospital. Hospital admitting procedure reviewed. Hospital consent for labor and birth signed. Birth plan reviewed. [facility] consents signed today.  IOL reviewed will use pitocin. Discussed ultrasound results , fearful as she thinks based on ultrasound baby will be over 10 #  Discussed primary c/section. Discussed shoulder dystocia, desires to proceed with IOL on   NST reactive  Follow up in 1 weeks for a routine prenatal visit.

## 2024-07-26 ENCOUNTER — APPOINTMENT (OUTPATIENT)
Dept: OBSTETRICS AND GYNECOLOGY | Facility: CLINIC | Age: 24
End: 2024-07-26
Payer: COMMERCIAL

## 2024-07-26 VITALS
SYSTOLIC BLOOD PRESSURE: 115 MMHG | DIASTOLIC BLOOD PRESSURE: 70 MMHG | BODY MASS INDEX: 44.69 KG/M2 | WEIGHT: 260.38 LBS

## 2024-07-26 DIAGNOSIS — O46.92 VAGINAL BLEEDING IN PREGNANCY, SECOND TRIMESTER (HHS-HCC): ICD-10-CM

## 2024-07-26 DIAGNOSIS — O99.212 OBESITY AFFECTING PREGNANCY IN SECOND TRIMESTER, UNSPECIFIED OBESITY TYPE (HHS-HCC): Primary | ICD-10-CM

## 2024-07-26 DIAGNOSIS — Z34.92: ICD-10-CM

## 2024-07-26 DIAGNOSIS — O99.011 ANEMIA AFFECTING PREGNANCY IN FIRST TRIMESTER (HHS-HCC): ICD-10-CM

## 2024-07-26 DIAGNOSIS — Z3A.36 36 WEEKS GESTATION OF PREGNANCY (HHS-HCC): ICD-10-CM

## 2024-07-26 PROCEDURE — 87081 CULTURE SCREEN ONLY: CPT

## 2024-07-26 PROCEDURE — 0501F PRENATAL FLOW SHEET: CPT | Performed by: ADVANCED PRACTICE MIDWIFE

## 2024-07-28 LAB — GP B STREP GENITAL QL CULT: NORMAL

## 2024-07-29 LAB — GP B STREP GENITAL QL CULT: NORMAL

## 2024-07-31 ENCOUNTER — APPOINTMENT (OUTPATIENT)
Dept: OBSTETRICS AND GYNECOLOGY | Facility: CLINIC | Age: 24
End: 2024-07-31
Payer: COMMERCIAL

## 2024-08-02 ENCOUNTER — APPOINTMENT (OUTPATIENT)
Dept: OBSTETRICS AND GYNECOLOGY | Facility: CLINIC | Age: 24
End: 2024-08-02
Payer: COMMERCIAL

## 2024-08-02 VITALS — DIASTOLIC BLOOD PRESSURE: 68 MMHG | BODY MASS INDEX: 44.63 KG/M2 | SYSTOLIC BLOOD PRESSURE: 100 MMHG | WEIGHT: 260 LBS

## 2024-08-02 DIAGNOSIS — Z3A.37 37 WEEKS GESTATION OF PREGNANCY (HHS-HCC): ICD-10-CM

## 2024-08-02 DIAGNOSIS — O99.213 OBESITY AFFECTING PREGNANCY IN THIRD TRIMESTER, UNSPECIFIED OBESITY TYPE (HHS-HCC): Primary | ICD-10-CM

## 2024-08-02 PROCEDURE — 0501F PRENATAL FLOW SHEET: CPT | Performed by: ADVANCED PRACTICE MIDWIFE

## 2024-08-02 PROCEDURE — 59025 FETAL NON-STRESS TEST: CPT | Performed by: ADVANCED PRACTICE MIDWIFE

## 2024-08-02 NOTE — PROGRESS NOTES
Subjective   Patient ID [id number]   Anya De Anda is a 24 y.o.  at 37w3d with a working estimated date of delivery of 2024, by Last Menstrual Period who presents for a routine prenatal visit. She denies vaginal bleeding, contractions or leaking of fluid    Objective   Physical Exam:     Constitutional: Alert and oriented, cooperative, no acute distress, well nourished, well hydrated     HEENT: normal    Neuropsych: normal affect, well groomed, good eye contact          Weight: 118 kg (260 lb), Pregravid BMI: 42.89  Expected Total Weight Gain: 5 kg (11 lb)-9 kg (19 lb)   BP: 100/68      Assessment/Plan   NST reactive  Continue prenatal vitamin  Reviewed signs of labor including contractions. Leaking of fluid, vaginal bleeding . Pt aware to call for signs of labor and or  decreased fetal movement. Aware to call answering service prior to heading to the hospital.  Discussed IOL after 39 weeks. Scheduled on  with Brezine Pitocin reviewed.  Desires NCB will utilize room 8.  Follow up in 1 weeks for a routine prenatal visit. Needs NST

## 2024-08-07 ENCOUNTER — APPOINTMENT (OUTPATIENT)
Dept: OBSTETRICS AND GYNECOLOGY | Facility: CLINIC | Age: 24
End: 2024-08-07
Payer: COMMERCIAL

## 2024-08-07 DIAGNOSIS — Z3A.38 38 WEEKS GESTATION OF PREGNANCY (HHS-HCC): Primary | ICD-10-CM

## 2024-08-07 DIAGNOSIS — O99.213 OBESITY AFFECTING PREGNANCY IN THIRD TRIMESTER, UNSPECIFIED OBESITY TYPE (HHS-HCC): ICD-10-CM

## 2024-08-07 PROCEDURE — 0501F PRENATAL FLOW SHEET: CPT

## 2024-08-07 PROCEDURE — 59025 FETAL NON-STRESS TEST: CPT

## 2024-08-07 NOTE — PROCEDURES
Anya De Anda, a  at 38w1d with an MAYELIN of 2024, by Last Menstrual Period, was seen at [facility] for a nonstress test.    Non-Stress Test   Baseline Fetal Heart Rate for Non-Stress Test: 130 BPM  Variability in Waveform for Non-Stress Test: Moderate  Accelerations in Non-Stress Test: lasting at least 15 seconds, greater than/equal to 15 bpm  Decelerations in Non-Stress Test: None  Contractions in Non-Stress Test: Irregular  Acoustic Stimulator for Non-Stress Test: No  Interpretation of Non-Stress Test   Interpretation of Non-Stress Test: Reactive

## 2024-08-07 NOTE — PROGRESS NOTES
Subjective     Anya De Anda is a 24 y.o.  at 38w1d with a working estimated date of delivery of 2024, by Last Menstrual Period who presents for a routine prenatal visit. She denies vaginal bleeding, leakage of fluid, decreased fetal movements, or contractions.    Patient reports overall feeling well.     Her pregnancy is complicated by:  Medical Problems       Problem List       Encounter for supervision of low-risk pregnancy in second trimester ([facility])    Overview Addendum 2024 11:26 AM by MAKENNA Johnson     Elevated 1 hr gtt  Starting BMI 42  Rubella nonimmune  Poly 28.0 at 32 weeks  repeat at 36 weeks  Weekly NST after 34 weeks   IOL at 39 weeks         Vaginal bleeding in pregnancy, second trimester ([facility])    Obesity affecting pregnancy in second trimester ([facility])    Overview Addendum 2024 10:15 AM by MAKENNA Johnson     Weekly NSTs at 34 weeks  Growth U/S at 36 weeks EFW 8# 12 ounces greater than 99 %  IOL 39.0          Anemia affecting pregnancy in first trimester ([facility])    Overview Signed 2024  3:16 PM by MAKENNA Johnson     Initial hgb 11.7         Asthma ([facility])    Vasovagal syncope    Psoriasis    Depressive disorder    Cough on exercise          Objective      TW.536 kg (10 lb)  Pregravid BMI: 42.89    Prenatal Labs:  Lab Results   Component Value Date    HGB 11.9 (L) 2024    HCT 35.8 (L) 2024     2024    ABO O 2024    LABRH POS 2024    NEISSGONOAMP Negative 2024    CHLAMTRACAMP Negative 2024    SYPHT Nonreactive 2024    HEPBSAG Nonreactive 2024    HIV1X2 Nonreactive 2024    URINECULTURE (A) 2024     Multiple organisms present, probable contamination. Repeat culture if clinically indicated.       Assessment/Plan   Consent obtained today  Present to L and D for IOL . Follow up postpartum  NST today reactive  Reviewed s/sx of labor, warning signs,  fetal movement counts, and when to call provider      Debbie Alonzo, APRN-CNM

## 2024-08-13 ENCOUNTER — ANESTHESIA EVENT (OUTPATIENT)
Dept: OBSTETRICS AND GYNECOLOGY | Facility: HOSPITAL | Age: 24
End: 2024-08-13
Payer: COMMERCIAL

## 2024-08-13 ENCOUNTER — ANESTHESIA (OUTPATIENT)
Dept: OBSTETRICS AND GYNECOLOGY | Facility: HOSPITAL | Age: 24
End: 2024-08-13
Payer: COMMERCIAL

## 2024-08-13 ENCOUNTER — HOSPITAL ENCOUNTER (INPATIENT)
Facility: HOSPITAL | Age: 24
LOS: 1 days | Discharge: HOME | End: 2024-08-14
Attending: OBSTETRICS & GYNECOLOGY | Admitting: ADVANCED PRACTICE MIDWIFE
Payer: COMMERCIAL

## 2024-08-13 ENCOUNTER — APPOINTMENT (OUTPATIENT)
Dept: OBSTETRICS AND GYNECOLOGY | Facility: HOSPITAL | Age: 24
End: 2024-08-13
Payer: COMMERCIAL

## 2024-08-13 DIAGNOSIS — O99.212 OBESITY AFFECTING PREGNANCY IN SECOND TRIMESTER, UNSPECIFIED OBESITY TYPE (HHS-HCC): ICD-10-CM

## 2024-08-13 PROBLEM — Z34.90 ENCOUNTER FOR PLANNED INDUCTION OF LABOR (HHS-HCC): Status: ACTIVE | Noted: 2024-08-13

## 2024-08-13 LAB
ABO GROUP (TYPE) IN BLOOD: NORMAL
ANTIBODY SCREEN: NORMAL
ERYTHROCYTE [DISTWIDTH] IN BLOOD BY AUTOMATED COUNT: 14.7 % (ref 11.5–14.5)
HCT VFR BLD AUTO: 34.4 % (ref 36–46)
HGB BLD-MCNC: 10.8 G/DL (ref 12–16)
MCH RBC QN AUTO: 25.7 PG (ref 26–34)
MCHC RBC AUTO-ENTMCNC: 31.4 G/DL (ref 32–36)
MCV RBC AUTO: 82 FL (ref 80–100)
NRBC BLD-RTO: 0.3 /100 WBCS (ref 0–0)
PLATELET # BLD AUTO: 236 X10*3/UL (ref 150–450)
RBC # BLD AUTO: 4.2 X10*6/UL (ref 4–5.2)
RH FACTOR (ANTIGEN D): NORMAL
TREPONEMA PALLIDUM IGG+IGM AB [PRESENCE] IN SERUM OR PLASMA BY IMMUNOASSAY: NONREACTIVE
WBC # BLD AUTO: 10.1 X10*3/UL (ref 4.4–11.3)

## 2024-08-13 PROCEDURE — 86901 BLOOD TYPING SEROLOGIC RH(D): CPT | Performed by: ADVANCED PRACTICE MIDWIFE

## 2024-08-13 PROCEDURE — 3E033VJ INTRODUCTION OF OTHER HORMONE INTO PERIPHERAL VEIN, PERCUTANEOUS APPROACH: ICD-10-PCS | Performed by: ADVANCED PRACTICE MIDWIFE

## 2024-08-13 PROCEDURE — 59410 OBSTETRICAL CARE: CPT | Performed by: ADVANCED PRACTICE MIDWIFE

## 2024-08-13 PROCEDURE — 10907ZC DRAINAGE OF AMNIOTIC FLUID, THERAPEUTIC FROM PRODUCTS OF CONCEPTION, VIA NATURAL OR ARTIFICIAL OPENING: ICD-10-PCS | Performed by: ADVANCED PRACTICE MIDWIFE

## 2024-08-13 PROCEDURE — 2500000005 HC RX 250 GENERAL PHARMACY W/O HCPCS: Performed by: NURSE ANESTHETIST, CERTIFIED REGISTERED

## 2024-08-13 PROCEDURE — 86780 TREPONEMA PALLIDUM: CPT | Mod: STJLAB | Performed by: ADVANCED PRACTICE MIDWIFE

## 2024-08-13 PROCEDURE — 2500000004 HC RX 250 GENERAL PHARMACY W/ HCPCS (ALT 636 FOR OP/ED): Performed by: ADVANCED PRACTICE MIDWIFE

## 2024-08-13 PROCEDURE — 59409 OBSTETRICAL CARE: CPT | Performed by: ADVANCED PRACTICE MIDWIFE

## 2024-08-13 PROCEDURE — 2500000004 HC RX 250 GENERAL PHARMACY W/ HCPCS (ALT 636 FOR OP/ED): Performed by: NURSE ANESTHETIST, CERTIFIED REGISTERED

## 2024-08-13 PROCEDURE — 59050 FETAL MONITOR W/REPORT: CPT

## 2024-08-13 PROCEDURE — 3700000014 EPIDURAL BLOCK: Performed by: NURSE ANESTHETIST, CERTIFIED REGISTERED

## 2024-08-13 PROCEDURE — 1220000001 HC OB SEMI-PRIVATE ROOM DAILY

## 2024-08-13 PROCEDURE — 7210000002 HC LABOR PER HOUR

## 2024-08-13 PROCEDURE — 2500000001 HC RX 250 WO HCPCS SELF ADMINISTERED DRUGS (ALT 637 FOR MEDICARE OP): Performed by: ADVANCED PRACTICE MIDWIFE

## 2024-08-13 PROCEDURE — 2500000002 HC RX 250 W HCPCS SELF ADMINISTERED DRUGS (ALT 637 FOR MEDICARE OP, ALT 636 FOR OP/ED): Performed by: OBSTETRICS & GYNECOLOGY

## 2024-08-13 PROCEDURE — 85027 COMPLETE CBC AUTOMATED: CPT | Performed by: ADVANCED PRACTICE MIDWIFE

## 2024-08-13 PROCEDURE — 7100000016 HC LABOR RECOVERY PER HOUR

## 2024-08-13 PROCEDURE — 36415 COLL VENOUS BLD VENIPUNCTURE: CPT | Performed by: ADVANCED PRACTICE MIDWIFE

## 2024-08-13 PROCEDURE — 51701 INSERT BLADDER CATHETER: CPT

## 2024-08-13 RX ORDER — IBUPROFEN 600 MG/1
600 TABLET ORAL EVERY 6 HOURS SCHEDULED
Status: DISCONTINUED | OUTPATIENT
Start: 2024-08-13 | End: 2024-08-14 | Stop reason: HOSPADM

## 2024-08-13 RX ORDER — TERBUTALINE SULFATE 1 MG/ML
0.25 INJECTION SUBCUTANEOUS ONCE AS NEEDED
Status: DISCONTINUED | OUTPATIENT
Start: 2024-08-13 | End: 2024-08-13

## 2024-08-13 RX ORDER — METHYLERGONOVINE MALEATE 0.2 MG/ML
0.2 INJECTION INTRAVENOUS ONCE AS NEEDED
Status: DISCONTINUED | OUTPATIENT
Start: 2024-08-13 | End: 2024-08-13

## 2024-08-13 RX ORDER — LOPERAMIDE HYDROCHLORIDE 2 MG/1
4 CAPSULE ORAL EVERY 2 HOUR PRN
Status: DISCONTINUED | OUTPATIENT
Start: 2024-08-13 | End: 2024-08-13

## 2024-08-13 RX ORDER — ADHESIVE BANDAGE
10 BANDAGE TOPICAL
Status: DISCONTINUED | OUTPATIENT
Start: 2024-08-13 | End: 2024-08-14 | Stop reason: HOSPADM

## 2024-08-13 RX ORDER — METHYLERGONOVINE MALEATE 0.2 MG/ML
0.2 INJECTION INTRAVENOUS ONCE AS NEEDED
Status: DISCONTINUED | OUTPATIENT
Start: 2024-08-13 | End: 2024-08-14 | Stop reason: HOSPADM

## 2024-08-13 RX ORDER — ACETAMINOPHEN 325 MG/1
975 TABLET ORAL EVERY 6 HOURS SCHEDULED
Status: DISCONTINUED | OUTPATIENT
Start: 2024-08-13 | End: 2024-08-14 | Stop reason: HOSPADM

## 2024-08-13 RX ORDER — SIMETHICONE 80 MG
80 TABLET,CHEWABLE ORAL 4 TIMES DAILY PRN
Status: DISCONTINUED | OUTPATIENT
Start: 2024-08-13 | End: 2024-08-14 | Stop reason: HOSPADM

## 2024-08-13 RX ORDER — DIPHENHYDRAMINE HYDROCHLORIDE 50 MG/ML
25 INJECTION INTRAMUSCULAR; INTRAVENOUS EVERY 6 HOURS PRN
Status: DISCONTINUED | OUTPATIENT
Start: 2024-08-13 | End: 2024-08-14 | Stop reason: HOSPADM

## 2024-08-13 RX ORDER — ONDANSETRON HYDROCHLORIDE 2 MG/ML
4 INJECTION, SOLUTION INTRAVENOUS EVERY 6 HOURS PRN
Status: DISCONTINUED | OUTPATIENT
Start: 2024-08-13 | End: 2024-08-14 | Stop reason: HOSPADM

## 2024-08-13 RX ORDER — MISOPROSTOL 200 UG/1
800 TABLET ORAL ONCE AS NEEDED
Status: DISCONTINUED | OUTPATIENT
Start: 2024-08-13 | End: 2024-08-13

## 2024-08-13 RX ORDER — BISACODYL 10 MG/1
10 SUPPOSITORY RECTAL DAILY PRN
Status: DISCONTINUED | OUTPATIENT
Start: 2024-08-13 | End: 2024-08-14 | Stop reason: HOSPADM

## 2024-08-13 RX ORDER — NALBUPHINE HYDROCHLORIDE 10 MG/ML
10 INJECTION, SOLUTION INTRAMUSCULAR; INTRAVENOUS; SUBCUTANEOUS
Status: DISCONTINUED | OUTPATIENT
Start: 2024-08-13 | End: 2024-08-14 | Stop reason: HOSPADM

## 2024-08-13 RX ORDER — HYDRALAZINE HYDROCHLORIDE 20 MG/ML
5 INJECTION INTRAMUSCULAR; INTRAVENOUS ONCE AS NEEDED
Status: DISCONTINUED | OUTPATIENT
Start: 2024-08-13 | End: 2024-08-13

## 2024-08-13 RX ORDER — LIDOCAINE 560 MG/1
1 PATCH PERCUTANEOUS; TOPICAL; TRANSDERMAL
Status: DISCONTINUED | OUTPATIENT
Start: 2024-08-13 | End: 2024-08-14 | Stop reason: HOSPADM

## 2024-08-13 RX ORDER — POLYETHYLENE GLYCOL 3350 17 G/17G
17 POWDER, FOR SOLUTION ORAL 2 TIMES DAILY PRN
Status: DISCONTINUED | OUTPATIENT
Start: 2024-08-13 | End: 2024-08-14 | Stop reason: HOSPADM

## 2024-08-13 RX ORDER — NIFEDIPINE 10 MG/1
10 CAPSULE ORAL ONCE AS NEEDED
Status: DISCONTINUED | OUTPATIENT
Start: 2024-08-13 | End: 2024-08-14 | Stop reason: HOSPADM

## 2024-08-13 RX ORDER — DIPHENHYDRAMINE HCL 25 MG
25 TABLET ORAL EVERY 6 HOURS PRN
Status: DISCONTINUED | OUTPATIENT
Start: 2024-08-13 | End: 2024-08-14 | Stop reason: HOSPADM

## 2024-08-13 RX ORDER — SODIUM CHLORIDE, SODIUM LACTATE, POTASSIUM CHLORIDE, CALCIUM CHLORIDE 600; 310; 30; 20 MG/100ML; MG/100ML; MG/100ML; MG/100ML
125 INJECTION, SOLUTION INTRAVENOUS CONTINUOUS
Status: DISCONTINUED | OUTPATIENT
Start: 2024-08-13 | End: 2024-08-14 | Stop reason: HOSPADM

## 2024-08-13 RX ORDER — LOPERAMIDE HYDROCHLORIDE 2 MG/1
4 CAPSULE ORAL EVERY 2 HOUR PRN
Status: DISCONTINUED | OUTPATIENT
Start: 2024-08-13 | End: 2024-08-14 | Stop reason: HOSPADM

## 2024-08-13 RX ORDER — OXYTOCIN 10 [USP'U]/ML
10 INJECTION, SOLUTION INTRAMUSCULAR; INTRAVENOUS ONCE AS NEEDED
Status: DISCONTINUED | OUTPATIENT
Start: 2024-08-13 | End: 2024-08-13

## 2024-08-13 RX ORDER — CARBOPROST TROMETHAMINE 250 UG/ML
250 INJECTION, SOLUTION INTRAMUSCULAR ONCE AS NEEDED
Status: DISCONTINUED | OUTPATIENT
Start: 2024-08-13 | End: 2024-08-14 | Stop reason: HOSPADM

## 2024-08-13 RX ORDER — MIFEPRISTONE 200 MG/1
200 TABLET ORAL ONCE
Status: DISCONTINUED | OUTPATIENT
Start: 2024-08-13 | End: 2024-08-13

## 2024-08-13 RX ORDER — MISOPROSTOL 200 UG/1
800 TABLET ORAL ONCE AS NEEDED
Status: DISCONTINUED | OUTPATIENT
Start: 2024-08-13 | End: 2024-08-14 | Stop reason: HOSPADM

## 2024-08-13 RX ORDER — HYDRALAZINE HYDROCHLORIDE 20 MG/ML
5 INJECTION INTRAMUSCULAR; INTRAVENOUS ONCE AS NEEDED
Status: DISCONTINUED | OUTPATIENT
Start: 2024-08-13 | End: 2024-08-14 | Stop reason: HOSPADM

## 2024-08-13 RX ORDER — ONDANSETRON 4 MG/1
4 TABLET, FILM COATED ORAL EVERY 6 HOURS PRN
Status: DISCONTINUED | OUTPATIENT
Start: 2024-08-13 | End: 2024-08-14 | Stop reason: HOSPADM

## 2024-08-13 RX ORDER — TRANEXAMIC ACID 100 MG/ML
1000 INJECTION, SOLUTION INTRAVENOUS ONCE AS NEEDED
Status: DISCONTINUED | OUTPATIENT
Start: 2024-08-13 | End: 2024-08-14 | Stop reason: HOSPADM

## 2024-08-13 RX ORDER — ENOXAPARIN SODIUM 100 MG/ML
60 INJECTION SUBCUTANEOUS EVERY 24 HOURS
Status: DISCONTINUED | OUTPATIENT
Start: 2024-08-14 | End: 2024-08-14 | Stop reason: HOSPADM

## 2024-08-13 RX ORDER — METOCLOPRAMIDE HYDROCHLORIDE 5 MG/ML
10 INJECTION INTRAMUSCULAR; INTRAVENOUS EVERY 6 HOURS PRN
Status: DISCONTINUED | OUTPATIENT
Start: 2024-08-13 | End: 2024-08-14 | Stop reason: HOSPADM

## 2024-08-13 RX ORDER — LIDOCAINE HYDROCHLORIDE 20 MG/ML
INJECTION, SOLUTION INFILTRATION; PERINEURAL AS NEEDED
Status: DISCONTINUED | OUTPATIENT
Start: 2024-08-13 | End: 2024-08-13

## 2024-08-13 RX ORDER — CARBOPROST TROMETHAMINE 250 UG/ML
250 INJECTION, SOLUTION INTRAMUSCULAR ONCE AS NEEDED
Status: DISCONTINUED | OUTPATIENT
Start: 2024-08-13 | End: 2024-08-13

## 2024-08-13 RX ORDER — TRANEXAMIC ACID 100 MG/ML
1000 INJECTION, SOLUTION INTRAVENOUS ONCE AS NEEDED
Status: DISCONTINUED | OUTPATIENT
Start: 2024-08-13 | End: 2024-08-13

## 2024-08-13 RX ORDER — OXYTOCIN 10 [USP'U]/ML
10 INJECTION, SOLUTION INTRAMUSCULAR; INTRAVENOUS ONCE AS NEEDED
Status: DISCONTINUED | OUTPATIENT
Start: 2024-08-13 | End: 2024-08-14 | Stop reason: HOSPADM

## 2024-08-13 RX ORDER — FENTANYL/ROPIVACAINE/NS/PF 2MCG/ML-.2
0-25 PLASTIC BAG, INJECTION (ML) INJECTION CONTINUOUS
Status: DISCONTINUED | OUTPATIENT
Start: 2024-08-13 | End: 2024-08-14 | Stop reason: HOSPADM

## 2024-08-13 RX ORDER — AMOXICILLIN 250 MG
2 CAPSULE ORAL NIGHTLY PRN
Status: DISCONTINUED | OUTPATIENT
Start: 2024-08-13 | End: 2024-08-14 | Stop reason: HOSPADM

## 2024-08-13 RX ORDER — LABETALOL HYDROCHLORIDE 5 MG/ML
20 INJECTION, SOLUTION INTRAVENOUS ONCE AS NEEDED
Status: DISCONTINUED | OUTPATIENT
Start: 2024-08-13 | End: 2024-08-14 | Stop reason: HOSPADM

## 2024-08-13 RX ORDER — OXYTOCIN/0.9 % SODIUM CHLORIDE 30/500 ML
60 PLASTIC BAG, INJECTION (ML) INTRAVENOUS ONCE AS NEEDED
Status: DISCONTINUED | OUTPATIENT
Start: 2024-08-13 | End: 2024-08-13

## 2024-08-13 RX ORDER — MISOPROSTOL 200 UG/1
200 TABLET ORAL ONCE
Status: DISCONTINUED | OUTPATIENT
Start: 2024-08-13 | End: 2024-08-14 | Stop reason: HOSPADM

## 2024-08-13 RX ORDER — LABETALOL HYDROCHLORIDE 5 MG/ML
20 INJECTION, SOLUTION INTRAVENOUS ONCE AS NEEDED
Status: DISCONTINUED | OUTPATIENT
Start: 2024-08-13 | End: 2024-08-13

## 2024-08-13 RX ORDER — INDOMETHACIN 25 MG/1
CAPSULE ORAL AS NEEDED
Status: DISCONTINUED | OUTPATIENT
Start: 2024-08-13 | End: 2024-08-13

## 2024-08-13 RX ORDER — LIDOCAINE HYDROCHLORIDE 10 MG/ML
30 INJECTION INFILTRATION; PERINEURAL ONCE AS NEEDED
Status: DISCONTINUED | OUTPATIENT
Start: 2024-08-13 | End: 2024-08-14 | Stop reason: HOSPADM

## 2024-08-13 RX ORDER — NIFEDIPINE 10 MG/1
10 CAPSULE ORAL ONCE AS NEEDED
Status: DISCONTINUED | OUTPATIENT
Start: 2024-08-13 | End: 2024-08-13

## 2024-08-13 RX ORDER — OXYTOCIN/0.9 % SODIUM CHLORIDE 30/500 ML
2-30 PLASTIC BAG, INJECTION (ML) INTRAVENOUS CONTINUOUS
Status: DISCONTINUED | OUTPATIENT
Start: 2024-08-13 | End: 2024-08-14 | Stop reason: HOSPADM

## 2024-08-13 RX ORDER — METOCLOPRAMIDE 10 MG/1
10 TABLET ORAL EVERY 6 HOURS PRN
Status: DISCONTINUED | OUTPATIENT
Start: 2024-08-13 | End: 2024-08-14 | Stop reason: HOSPADM

## 2024-08-13 RX ORDER — MISOPROSTOL 200 UG/1
200 TABLET ORAL ONCE
Status: DISCONTINUED | OUTPATIENT
Start: 2024-08-13 | End: 2024-08-13

## 2024-08-13 SDOH — SOCIAL STABILITY: SOCIAL INSECURITY: ARE THERE ANY APPARENT SIGNS OF INJURIES/BEHAVIORS THAT COULD BE RELATED TO ABUSE/NEGLECT?: NO

## 2024-08-13 SDOH — SOCIAL STABILITY: SOCIAL INSECURITY: VERBAL ABUSE: DENIES

## 2024-08-13 SDOH — SOCIAL STABILITY: SOCIAL INSECURITY: HAVE YOU HAD ANY THOUGHTS OF HARMING ANYONE ELSE?: NO

## 2024-08-13 SDOH — SOCIAL STABILITY: SOCIAL INSECURITY: PHYSICAL ABUSE: DENIES

## 2024-08-13 SDOH — SOCIAL STABILITY: SOCIAL INSECURITY: DO YOU FEEL ANYONE HAS EXPLOITED OR TAKEN ADVANTAGE OF YOU FINANCIALLY OR OF YOUR PERSONAL PROPERTY?: NO

## 2024-08-13 SDOH — SOCIAL STABILITY: SOCIAL INSECURITY: HAVE YOU HAD THOUGHTS OF HARMING ANYONE ELSE?: NO

## 2024-08-13 SDOH — HEALTH STABILITY: MENTAL HEALTH: SUICIDAL BEHAVIOR (LIFETIME): NO

## 2024-08-13 SDOH — ECONOMIC STABILITY: HOUSING INSECURITY: DO YOU FEEL UNSAFE GOING BACK TO THE PLACE WHERE YOU ARE LIVING?: NO

## 2024-08-13 SDOH — HEALTH STABILITY: MENTAL HEALTH: HAVE YOU USED ANY PRESCRIPTION DRUGS OTHER THAN PRESCRIBED IN THE PAST 12 MONTHS?: NO

## 2024-08-13 SDOH — SOCIAL STABILITY: SOCIAL INSECURITY: ARE YOU OR HAVE YOU BEEN THREATENED OR ABUSED PHYSICALLY, EMOTIONALLY, OR SEXUALLY BY ANYONE?: NO

## 2024-08-13 SDOH — HEALTH STABILITY: MENTAL HEALTH: WERE YOU ABLE TO COMPLETE ALL THE BEHAVIORAL HEALTH SCREENINGS?: YES

## 2024-08-13 SDOH — HEALTH STABILITY: MENTAL HEALTH: WISH TO BE DEAD (PAST 1 MONTH): NO

## 2024-08-13 SDOH — HEALTH STABILITY: MENTAL HEALTH: CURRENT SMOKER: 0

## 2024-08-13 SDOH — SOCIAL STABILITY: SOCIAL INSECURITY: ABUSE SCREEN: ADULT

## 2024-08-13 SDOH — SOCIAL STABILITY: SOCIAL INSECURITY: HAS ANYONE EVER THREATENED TO HURT YOUR FAMILY OR YOUR PETS?: NO

## 2024-08-13 SDOH — HEALTH STABILITY: MENTAL HEALTH: NON-SPECIFIC ACTIVE SUICIDAL THOUGHTS (PAST 1 MONTH): NO

## 2024-08-13 SDOH — HEALTH STABILITY: MENTAL HEALTH: HAVE YOU USED ANY SUBSTANCES (CANABIS, COCAINE, HEROIN, HALLUCINOGENS, INHALANTS, ETC.) IN THE PAST 12 MONTHS?: NO

## 2024-08-13 SDOH — SOCIAL STABILITY: SOCIAL INSECURITY: DOES ANYONE TRY TO KEEP YOU FROM HAVING/CONTACTING OTHER FRIENDS OR DOING THINGS OUTSIDE YOUR HOME?: NO

## 2024-08-13 ASSESSMENT — PAIN SCALES - GENERAL
PAINLEVEL_OUTOF10: 0 - NO PAIN
PAINLEVEL_OUTOF10: 6
PAINLEVEL_OUTOF10: 0 - NO PAIN
PAINLEVEL_OUTOF10: 0 - NO PAIN
PAINLEVEL_OUTOF10: 1
PAINLEVEL_OUTOF10: 1
PAINLEVEL_OUTOF10: 8
PAINLEVEL_OUTOF10: 5 - MODERATE PAIN
PAIN_LEVEL: 1
PAINLEVEL_OUTOF10: 5 - MODERATE PAIN
PAINLEVEL_OUTOF10: 3
PAINLEVEL_OUTOF10: 0 - NO PAIN
PAINLEVEL_OUTOF10: 3
PAINLEVEL_OUTOF10: 5 - MODERATE PAIN
PAINLEVEL_OUTOF10: 2
PAINLEVEL_OUTOF10: 8
PAINLEVEL_OUTOF10: 3

## 2024-08-13 ASSESSMENT — PATIENT HEALTH QUESTIONNAIRE - PHQ9
2. FEELING DOWN, DEPRESSED OR HOPELESS: NOT AT ALL
SUM OF ALL RESPONSES TO PHQ9 QUESTIONS 1 & 2: 0
1. LITTLE INTEREST OR PLEASURE IN DOING THINGS: NOT AT ALL

## 2024-08-13 ASSESSMENT — PAIN DESCRIPTION - DESCRIPTORS
DESCRIPTORS: CRAMPING
DESCRIPTORS: CRAMPING

## 2024-08-13 ASSESSMENT — LIFESTYLE VARIABLES
SKIP TO QUESTIONS 9-10: 1
HOW MANY STANDARD DRINKS CONTAINING ALCOHOL DO YOU HAVE ON A TYPICAL DAY: PATIENT DOES NOT DRINK
HOW OFTEN DO YOU HAVE 6 OR MORE DRINKS ON ONE OCCASION: NEVER
HOW OFTEN DO YOU HAVE A DRINK CONTAINING ALCOHOL: NEVER
AUDIT-C TOTAL SCORE: 0
AUDIT-C TOTAL SCORE: 0

## 2024-08-13 NOTE — ANESTHESIA PREPROCEDURE EVALUATION
Patient: Anya De Anda    Evaluation Method: In-person visit    Procedure Information    Date: 08/13/24  Procedure: Labor Consult         Relevant Problems   Pulmonary   (+) Asthma ([facility])      Neuro   (+) Depressive disorder      Endocrine   (+) Obesity affecting pregnancy in second trimester ([facility])      Hematology   (+) Anemia affecting pregnancy in first trimester ([facility])       Clinical information reviewed:   Tobacco  Allergies  Meds   Med Hx  Surg Hx   Fam Hx          NPO Detail:  No data recorded     OB/Gyn Evaluation    Present Pregnancy    Patient is pregnant now.   Obstetric History                Physical Exam    Airway  Mallampati: I  TM distance: >3 FB  Neck ROM: full     Cardiovascular - normal exam  Rhythm: regular  Rate: normal     Dental - normal exam     Pulmonary - normal exam  Breath sounds clear to auscultation     Abdominal      Other findings: Gravid        Anesthesia Plan    History of general anesthesia?: no  History of complications of general anesthesia?: unknown/emergency    ASA 3     epidural     The patient is not a current smoker.    Anesthetic plan and risks discussed with patient.  Use of blood products discussed with patient who consented to blood products.

## 2024-08-13 NOTE — ANESTHESIA PREPROCEDURE EVALUATION
Patient: Anya De Anda    Evaluation Method: In-person visit    Procedure Information    Date: 08/13/24  Procedure: Labor Consult         Relevant Problems   Pulmonary   (+) Asthma ([facility])      Neuro   (+) Depressive disorder      Endocrine   (+) Obesity affecting pregnancy in second trimester ([facility])      Hematology   (+) Anemia affecting pregnancy in first trimester ([facility])       Clinical information reviewed:   Tobacco  Allergies  Meds   Med Hx  Surg Hx   Fam Hx          NPO Detail:  No data recorded     OB/Gyn Evaluation    Present Pregnancy    Patient is pregnant now.   Obstetric History            Physical Exam    Airway  Mallampati: I  TM distance: >3 FB  Neck ROM: full     Cardiovascular - normal exam  Rhythm: regular  Rate: normal     Dental - normal exam     Pulmonary - normal exam  Breath sounds clear to auscultation     Abdominal      Other findings: Gravid      Anesthesia Plan    History of general anesthesia?: no  History of complications of general anesthesia?: unknown/emergency    ASA 3     epidural     The patient is not a current smoker.    Anesthetic plan and risks discussed with patient.  Use of blood products discussed with patient who consented to blood products.

## 2024-08-13 NOTE — L&D DELIVERY NOTE
OB Delivery Note  2024  Anya De Anda  24 y.o.   Vaginal, Spontaneous       Gestational Age: 39w0d  /Para:   Quantitative Blood Loss: Admission to Discharge: 380 mL (2024  7:37 AM - 2024  7:05 PM)    Raulito De Anda [[id number]]      Labor Events    Rupture date/time: 2024 1102  Rupture type: Artificial  Fluid color: Clear  Fluid odor: None  Labor type: Induced Onset of Labor  Labor allowed to proceed with plans for an attempted vaginal birth?: Yes  Induction: AROM, Oxytocin  Induction date/time: 2024 0800  Induction indications: Other  Complications: None       Labor Event Times    Labor onset date/time: 2024  Dilation complete date/time: 2024  Start pushing date/time: 2024 164       Labor Length    1st stage: 5h 33m  2nd stage: 0h 13m  3rd stage: 0h 04m       Placenta    Placenta delivery date/time: 2024 165  Placenta removal: Spontaneous  Placenta appearance: Intact  Placenta disposition: discarded       Cord    Vessels: 3 vessels  Complications: None  Delayed cord clamping?: Yes  Cord clamped date/time: 2024 16:50:00  Cord blood disposition: Discarded  Gases sent?: No  Stem cell collection (by provider): No       Lacerations    Episiotomy: None  Perineal laceration: None  Other lacerations?: No  Repair suture: None       Anesthesia    Method: Epidural       Operative Delivery    Forceps attempted?: No  Vacuum extractor attempted?: No       Shoulder Dystocia    Shoulder dystocia present?: No       [place] Delivery    Time head delivered: 2024 16:47:00  Birth date/time: 2024 16:48:00  Delivery type: Vaginal, Spontaneous  Complications: None       Resuscitation    Method: Tactile stimulation       Apgars    Living status: Living  Apgar Component Scores:  1 min.:  5 min.:  10 min.:  15 min.:  20 min.:    Skin color:  1  1       Heart rate:  2  2       Reflex irritability:  2  2       Muscle tone:  2  2       Respiratory  effort:  2  2       Total:  9  9       Apgars assigned by: AMBER       Delivery Providers    Delivering clinician: MAKENNA Johnson   Provider Role    Lucy Colvin RN Delivery Nurse    Nicole Hoyos RN Nursery Nurse     Resident                     MAKENNA Johnson

## 2024-08-13 NOTE — H&P
Obstetrical Admission History and Physical     Anya De Anda is a 24 y.o.  at 39w0d. MAYELIN: 2024, by Last Menstrual Period. Estimated fetal weight: 9# 8 ounces. She has had prenatal care with Brezine .  Elevated 1 hr gtt 3 hr normal  Ultrasound on  EFW 8#12oz      Chief Complaint: Scheduled Induction    Assessment/Plan   A: 39 week IUP scheduled induction      Morbid obesity      LGA  P; Admit      Labs      Pitocin      Support NCB      Reviewed with Dr. Villa agrees with plan    Principal Problem:    Encounter for planned induction of labor ([facility])      Pregnancy Problems (from 24 to present)       Problem Noted Resolved    Encounter for planned induction of labor ([facility]) 2024 by MAKENNA Johnson No    Priority:  Medium      Anemia affecting pregnancy in first trimester ([facility]) 2024 by MAKENNA Johnson No    Priority:  Medium      Overview Signed 2024  3:16 PM by MAKENNA Johnson     Initial hgb 11.7         Vaginal bleeding in pregnancy, second trimester ([facility]) 3/12/2024 by MAKENNA Cavazos No    Priority:  Medium      Obesity affecting pregnancy in second trimester ([facility]) 3/12/2024 by MAKENNA Cavazos No    Priority:  Medium      Overview Addendum 2024 10:15 AM by MAKENNA Johnson     Weekly NSTs at 34 weeks  Growth U/S at 36 weeks EFW 8# 12 ounces greater than 99 %  IOL 39.0          Encounter for supervision of low-risk pregnancy in second trimester ([facility]) 3/1/2024 by MAKENNA Johnson No    Priority:  Medium      Overview Addendum 2024 11:26 AM by MAKENNA Johnson     Elevated 1 hr gtt  Starting BMI 42  Rubella nonimmune  Poly 28.0 at 32 weeks  repeat at 36 weeks  Weekly NST after 34 weeks   IOL at 39 weeks               Options for delivery have been discussed with the patient and she elects for an induction of labor.    Induction of labor with pitocin,  amniotomy, reviewed questions answered. There was concurrence with the planned procedure, and the patient wanted to proceed.    Admit to inpatient status. I anticipate that this patient will require a stay exceeding at least 2 midnights for delivery and postpartum.  Induction of labor.  Management of pregnancy complications, as indicated.    Subjective   Good fetal movement. Denies vaginal bleeding.     Reason for Induction of Labor:  Pregnancy at 39 weeks or greater for induction       Obstetrical History   OB History    Para Term  AB Living   3 2 2     2   SAB IAB Ectopic Multiple Live Births           2      # Outcome Date GA Lbr Farooq/2nd Weight Sex Type Anes PTL Lv   3 Current            2 Term 21 38w0d   F Vag-Spont EPI  ARTURO   1 Term 20 38w0d   M Vag-Spont EPI  ARTURO       Past Medical History  History reviewed. No pertinent past medical history.     Past Surgical History   History reviewed. No pertinent surgical history.    Social History  Social History     Tobacco Use    Smoking status: Never    Smokeless tobacco: Never   Substance Use Topics    Alcohol use: Not Currently     Substance and Sexual Activity   Drug Use Never       Allergies  Patient has no known allergies.     Medications  Medications Prior to Admission   Medication Sig Dispense Refill Last Dose    PNV no.95/ferrous fum/folic ac (PRENATAL ORAL) Take by mouth once daily.   2024       Objective    Last Vitals  Temp Pulse Resp BP MAP O2 Sat                   Physical ExaminationGENERAL: Examination reveals a well developed, well nourished and obese, gravid female in no acute distress. She is alert and cooperative.  HEENT: conjunctiva clear.   ABDOMEN: soft, gravid, nontender, nondistended, no abnormal masses, no epigastric pain.  PELVIC:  2/50/-2 vertex  + accels moderate variability no decels  EXTREMITIES: no redness or tenderness in the calves or thighs, no edema. Good muscle tone with no atrophy.  SKIN: normal  coloration and turgor, no rashes.  NEUROLOGICAL: reflexes are DTRs normal and symmetrical. Normal sensation in all extremities.  PSYCHOLOGICAL: mood normal

## 2024-08-13 NOTE — PROGRESS NOTES
Labor Progress Note    Subjective:  Anya De Anda is on 10 mu of pitocin not yet feeling ctx    Objective:  Vitals  Temp:  [36.8 °C (98.2 °F)] 36.8 °C (98.2 °F)  Heart Rate:  [] 97  Resp:  [18] 18  BP: (125-138)/(77) 138/77     Cervical Exam: 3/50/-2    FHR: 130 baseline moderate variability + accels no decels    Contractions: 2-7 minutes apart coupling    Membranes: AROM 11:02    Assessment/Plan:  24 y.o.  at 39w0d  Principal Problem:    Encounter for planned induction of labor ([facility])     Latent labor  FHT Category 1  AROM clear fluid  Continue to monitor labor progress PRN.    Sayda Dinh, APRN-ODALYSM

## 2024-08-13 NOTE — ANESTHESIA PROCEDURE NOTES
Epidural Block    Patient location during procedure: OB  Start time: 8/13/2024 2:27 PM  End time: 8/13/2024 2:40 PM  Reason for block: labor analgesia  Staffing  Performed: CRNA   Authorized by: JEREMIAH Ramos    Performed by: JEREMIAH Ramos    Preanesthetic Checklist  Completed: patient identified, IV checked, site marked, risks and benefits discussed, surgical consent, pre-op evaluation, timeout performed and sterile techniques followed  Block Timeout  RN/Licensed healthcare professional reads aloud to the Anesthesia provider and entire team: Patient identity, procedure with side and site, patient position, and as applicable the availability of implants/special equipment/special requirements.  Patient on coagulant treatment: no  Timeout performed at:   Block Placement  Patient position: sitting  Prep: ChloraPrep  Sterility prep: cap, gloves, drape, mask and hand  Sedation level: no sedation  Patient monitoring: blood pressure, continuous pulse oximetry and heart rate  Approach: midline  Local numbing: lidocaine 1% to skin and subcutaneous tissues  Vertebral space: lumbar  Lumbar location: L3-L4  Epidural  Loss of resistance technique: air  Guidance: landmark technique        Needle  Needle type: Tuohy   Needle gauge: 17  Needle length: 10.2 cm  Needle insertion depth: 8 cm  Catheter type: multi-orifice  Catheter size: 19 G  Catheter at skin depth: 12 cm  Catheter securement method: clear occlusive dressing    Test dose: lidocaine 1.5% with epinephrine 1-to-200,000  Test dose: lidocaine 1.5% with epinephrine 1-to-200,000  Test dose result: no positive test dose    PCEA  Medication concentration used: 0.2% Ropivacaine with 2 mcg/mL Fentanyl  Dose (mL): 5  Lockout (minutes): 20  1-Hour Limit (boluses/hr): 3  Basal Rate: 8        Assessment  Block outcome: patient comfortable  Number of attempts: 1  Events: no positive test dose  Procedure assessment: patient tolerated procedure well with  no immediate complications

## 2024-08-13 NOTE — HOSPITAL COURSE
0800  24 y.o.  at 39w0d. MAYELIN: 2024, by Last Menstrual Period. Estimated fetal weight: 9# 8 ounces. She has had prenatal care with Brezine .  Elevated 1 hr gtt 3 hr normal  Ultrasound on  EFW 8#12oz  0900 Pitocin started  11:00 AROM clear pitocin at 8 MU  14:38 156/74  15:00 4/-1 pitocin at 18 mu epidural in  16:30 141/70  16:47  liveborn girl 9# 5 oz intact ebl 380 cc

## 2024-08-13 NOTE — PROGRESS NOTES
Labor Progress Note    Subjective:  Anya De Anda is comfortable with epidural    Objective:  Vitals  Temp:  [36.6 °C (97.8 °F)-36.8 °C (98.3 °F)] 36.6 °C (97.8 °F)  Heart Rate:  [] 102  Resp:  [18-20] 20  BP: (113-156)/(56-85) 143/73     Cervical Exam: /-1     FHR: 135 baseline + accesl moderate variability    Contractions: q 3 minutes pitocin at 18 MU    Membranes: since 11 AM clear fluid    Assessment/Plan:  24 y.o.  at 39w0d  Principal Problem:    Encounter for planned induction of labor ([facility])     Latent labor  Several mild range b/p elevations during epidural placement  FHT Category 1  Continue to monitor labor progress PRN.    Sayda Dinh, APRN-CNM

## 2024-08-13 NOTE — CARE PLAN
Problem: Vaginal Birth or  Section  Goal: Fetal and maternal status remain reassuring during the birth process  2024 by Lucy Colvin RN  Outcome: Met  2024 09 by Lucy Colvin RN  Outcome: Progressing  Goal: Tolerate CRB for IOL placement maintenance until dislodgement/removal 12hrs after placement  2024 by Lucy Colvin RN  Outcome: Met  2024 0943 by Lucy Colvin RN  Outcome: Progressing  Goal: Prevention of malpresentation/labor dystocia through delivery  2024 by Lucy Colvin RN  Outcome: Met  2024 by Lucy Colvin RN  Outcome: Progressing  Goal: Demonstrates labor coping techniques through delivery  2024 by Lucy Colvin RN  Outcome: Met  2024 by Lucy Colvin RN  Outcome: Progressing  Goal: Minimal s/sx of HDP and BP<160/110  2024 by Lucy Colvin RN  Outcome: Met  2024 0948 by Lucy Colvin RN  Outcome: Progressing  2024 09 by Lucy Colvin RN  Outcome: Progressing  Goal: No s/sx of infection through recovery  2024 by Lucy Colvin RN  Outcome: Met  2024 by Lucy Colvin RN  Outcome: Progressing  Goal: No s/sx of hemorrhage through recovery  2024 by Lucy Colvin RN  Outcome: Met  2024 0943 by Lucy Colvin RN  Outcome: Progressing     Problem: Pain - Adult  Goal: Verbalizes/displays adequate comfort level or baseline comfort level  2024 by Lucy Colvin RN  Outcome: Met  2024 09 by Lucy Colvin RN  Outcome: Progressing     Problem: Safety - Adult  Goal: Free from fall injury  2024 by Lucy Colvin RN  Outcome: Met  2024 0943 by Lucy Colvin RN  Outcome: Progressing     Problem: Skin  Goal: Decreased wound size/increased tissue granulation at next dressing change  Outcome: Met  Goal: Participates in  plan/prevention/treatment measures  Outcome: Met  Goal: Prevent/manage excess moisture  Outcome: Met  Goal: Prevent/minimize sheer/friction injuries  Outcome: Met  Goal: Promote/optimize nutrition  Outcome: Met  Goal: Promote skin healing  Outcome: Met   The patient's goals for the shift include I would like to NCB    The clinical goals for the shift include Facilitate IOL    Pt did not meet goal of NCB.  Elected to have Epidural

## 2024-08-14 VITALS
BODY MASS INDEX: 44.96 KG/M2 | HEIGHT: 64 IN | TEMPERATURE: 98.2 F | RESPIRATION RATE: 18 BRPM | OXYGEN SATURATION: 97 % | SYSTOLIC BLOOD PRESSURE: 119 MMHG | HEART RATE: 77 BPM | DIASTOLIC BLOOD PRESSURE: 71 MMHG | WEIGHT: 263.34 LBS

## 2024-08-14 PROBLEM — Z34.90 ENCOUNTER FOR PLANNED INDUCTION OF LABOR (HHS-HCC): Status: RESOLVED | Noted: 2024-08-13 | Resolved: 2024-08-14

## 2024-08-14 PROCEDURE — 2500000001 HC RX 250 WO HCPCS SELF ADMINISTERED DRUGS (ALT 637 FOR MEDICARE OP): Performed by: ADVANCED PRACTICE MIDWIFE

## 2024-08-14 ASSESSMENT — PAIN SCALES - GENERAL
PAINLEVEL_OUTOF10: 5 - MODERATE PAIN
PAINLEVEL_OUTOF10: 2
PAINLEVEL_OUTOF10: 0 - NO PAIN

## 2024-08-14 ASSESSMENT — ACTIVITIES OF DAILY LIVING (ADL): LACK_OF_TRANSPORTATION: NO

## 2024-08-14 NOTE — CARE PLAN
The patient's goals for the shift include pain under control    The clinical goals for the shift include vital signs remain WDL    Problem: Pain - Adult  Goal: Verbalizes/displays adequate comfort level or baseline comfort level  Outcome: Progressing  Flowsheets (Taken 2024)  Verbalizes/displays adequate comfort level or baseline comfort level: Encourage patient to monitor pain and request assistance     Problem: Postpartum  Goal: Experiences normal postpartum course  Outcome: Progressing  Flowsheets (Taken 2024)  Experiences normal postpartum course: Monitor maternal vital signs  Goal: Appropriate maternal -  bonding  Outcome: Progressing  Flowsheets (Taken 2024)  Appropriate maternal- bonding:   Identify family support   24-hour rooming in  Goal: Establish and maintain infant feeding pattern for adequate nutrition  Outcome: Progressing  Flowsheets (Taken 2024)  Establish and maintain infant feeding pattern for adequate nutrition: Assess  human milk feeding  Goal: Minimal s/sx of HDP and BP<160/110  Outcome: Progressing     Problem: Safety - Adult  Goal: Free from fall injury  Outcome: Progressing  Flowsheets (Taken 2024)  Free from fall injury: Instruct family/caregiver on patient safety     Problem: Discharge Planning  Goal: Discharge to home or other facility with appropriate resources  Outcome: Progressing  Flowsheets (Taken 2024)  Discharge to home or other facility with appropriate resources: Identify barriers to discharge with patient and caregiver

## 2024-08-14 NOTE — DISCHARGE SUMMARY
Discharge Summary    Admission Date: 8/13/2024  Discharge Date: 08/14/24    Discharge Diagnosis  Encounter for planned induction of labor ([facility])    Hospital Course  Delivery Date: 8/13/2024 4:48 PM  Delivery type: Vaginal, Spontaneous   GA at delivery: 39w0d  Outcome: Living  Anesthesia during delivery: Epidural  Intrapartum complications: None  Feeding method: Breastfeeding Status: Yes     Pertinent Physical Exam At Time of Discharge    General: Examination reveals a well developed, well nourished, female, in no acute distress. She is alert and cooperative.  HEENT: External ears normal. Nose normal, no erythema or discharge. Mouth and throat clear.  Lungs: normal effort.  Breasts: lactating, no erythema or tenderness, nipples normal.  Fundus: firm.  Extremities: no redness or tenderness in the calves or thighs, edema 1+.    Last Vitals:  Temp Pulse Resp BP MAP Pulse Ox   36.8 °C (98.2 °F) 77 18 119/71 86 97 %     Discharge Meds     Your medication list        CONTINUE taking these medications        Instructions Last Dose Given Next Dose Due   PRENATAL ORAL                     Complications Requiring Follow-Up  None    Test Results Pending At Discharge  Pending Labs       No current pending labs.            Outpatient Follow-Up  Schedule postpartum visit at 6 weeks      MAKENNA Jones

## 2024-08-14 NOTE — LACTATION NOTE
Lactation Consultant Note  Lactation Consultation  Reason for Consult: Initial assessment  Consultant Name: Elma Brennan    Maternal Information  Has mother  before?: Yes  How long did the mother previously breastfeed?: Older children 4 and 3 years. 3y/o latched for 2 weeks, tongue tied, pumped 6 months and bottle fed. 1y/o BF 1 month, mother had PPD, formula fed.  Previous Maternal Breastfeeding Challenges: Difficult latch, Tongue tie, Exclusive pump and bottle fed  Infant to breast within first 2 hours of birth?: Yes  Exclusive Pump and Bottle Feed: No    Maternal Assessment  Breast Assessment:  (Declined assessment)  Nipple Assessment:  (Declined assessment)  Alterations in Nipple Condition:  (Declined assessment)  [place] Assessment:  (Declined assessment)    Infant Assessment  Infant Assessment: Tongue protrudes over alveolar ridge, Sublingual frenulum (comment) (Frenulum attaches between midline and apex. Tongue elevates MCC to roof of mouth.)    Feeding Assessment  Nutrition Source: Breastmilk  Feeding Method: Nursing at the breast  Unable to assess infant feeding at this time: Maternal request (Declined assessment)  Feeding Position:  (Declined assessment)  Suck/Feeding:  (Declined assessment)  Latch Assessment:  (Declined assessment)    LATCH TOOL       Breast Pump  Pump: None    Other OB Lactation Tools       Patient Follow-up  Inpatient Lactation Follow-up Needed : No  Outpatient Lactation Follow-up: Recommended  Lactation Professional - OK to Discharge: Yes    Other OB Lactation Documentation  Maternal Risk Factors: Obesity (pre-pregnancy BMI >30), Postpartum depression  Infant Risk Factors: High birth weight >3600 g    Recommendations/Summary  , 39 weeks,  on @1648. Birthweight 4250g, LGA. 3% weight loss. TCB 5.4@24 hours. Mother experienced, older children ages 4 and 3. 5y/o had tongue tie, revised at 2 weeks, switched to pumping and bottles. Mother had PPD with 3/yo, switched to  formula at 4 weeks. Stated goal is exclusive breastfeeding. Reports infant has been latching well since delivery. Declines latch assessment. Consent given for oral exam to assess for tethered oral tissues. Lingual frenulum attaches between midline and apex, thin, tight. Tongue extends to alveolar ridge, heart shaping noted. Tongue elevates care home to roof of mouth, center depressed, sides elevated. Suck assessed, infant gaggy/spitty at this time, not sucking. Discussed assessment with parents. Recommended dentist follow up if mother has pain with feeding or infant not back up to birthweight by 2 weeks of life.     Discharge teaching reviewed. Taught engorgement management. Reviewed s/s of plugged ducts and mastitis and treatment. Reviewed normal feeding patterns of the “4th trimester” and outpatient support.

## 2024-08-14 NOTE — PROGRESS NOTES
Spiritual Care Visit    Clinical Encounter Type  Visited With: Patient and family together  Routine Visit: Introduction  Continue Visiting: No                                            Taxonomy  Intended Effects: Promote sense of peace, Preserve dignity and respect, Meaning-making  Methods: Offer support  Interventions: Provide a Yazidi item(s), Share words of hope and inspiration     congratulated the patient on her baby.  Patient had other children present and both grandmas.   gave the patient a  baby Bible and was a supportive presence.

## 2024-08-14 NOTE — ANESTHESIA POSTPROCEDURE EVALUATION
Patient: Anya De Anda    Procedure Summary       Date: 08/13/24 Room / Location:     Anesthesia Start: 1427 Anesthesia Stop: 1648    Procedure: Labor Analgesia Diagnosis:     Scheduled Providers:  Responsible Provider: JEREMIAH Ramos    Anesthesia Type: epidural ASA Status: 3            Anesthesia Type: epidural    Vitals Value Taken Time   /85 08/13/24 2035   Temp 36.9 °C (98.5 °F) 08/13/24 2035   Pulse 95 08/13/24 2035   Resp 18 08/13/24 2035   SpO2 96 % 08/13/24 2035       Anesthesia Post Evaluation    Patient location during evaluation: bedside  Patient participation: complete - patient participated  Level of consciousness: awake and alert  Pain score: 1  Pain management: adequate  Multimodal analgesia pain management approach  Airway patency: patent  Cardiovascular status: acceptable  Respiratory status: acceptable  Hydration status: acceptable  Postoperative Nausea and Vomiting: none  Comments: Epidural catheter removed by nursing. No redness, swelling, or drainage at puncture site.    Complete resolution of numbness. Patient is able to lift legs, bend at the knees, and ambulate.    Patient denies problems with urination.    Patient denies headache or severe back pain.     No notable events documented.

## 2024-08-14 NOTE — CARE PLAN
The patient's goals for the shift include pain under control    The clinical goals for the shift include stable VS and assessment

## 2024-08-16 ENCOUNTER — APPOINTMENT (OUTPATIENT)
Dept: OBSTETRICS AND GYNECOLOGY | Facility: CLINIC | Age: 24
End: 2024-08-16
Payer: COMMERCIAL

## 2024-08-23 ENCOUNTER — PATIENT MESSAGE (OUTPATIENT)
Dept: OBSTETRICS AND GYNECOLOGY | Facility: CLINIC | Age: 24
End: 2024-08-23

## 2024-08-23 ENCOUNTER — APPOINTMENT (OUTPATIENT)
Dept: OBSTETRICS AND GYNECOLOGY | Facility: CLINIC | Age: 24
End: 2024-08-23
Payer: COMMERCIAL

## 2024-08-30 ENCOUNTER — APPOINTMENT (OUTPATIENT)
Dept: OBSTETRICS AND GYNECOLOGY | Facility: CLINIC | Age: 24
End: 2024-08-30
Payer: COMMERCIAL

## 2025-02-06 ENCOUNTER — APPOINTMENT (OUTPATIENT)
Dept: OBSTETRICS AND GYNECOLOGY | Facility: CLINIC | Age: 25
End: 2025-02-06
Payer: COMMERCIAL

## 2025-02-20 ENCOUNTER — APPOINTMENT (OUTPATIENT)
Dept: OBSTETRICS AND GYNECOLOGY | Facility: CLINIC | Age: 25
End: 2025-02-20
Payer: COMMERCIAL

## 2025-02-20 VITALS — WEIGHT: 243.8 LBS | SYSTOLIC BLOOD PRESSURE: 102 MMHG | DIASTOLIC BLOOD PRESSURE: 69 MMHG | BODY MASS INDEX: 41.85 KG/M2

## 2025-02-20 DIAGNOSIS — O99.210 OBESITY AFFECTING PREGNANCY, ANTEPARTUM, UNSPECIFIED OBESITY TYPE (HHS-HCC): ICD-10-CM

## 2025-02-20 DIAGNOSIS — Z34.82 NORMAL PREGNANCY IN MULTIGRAVIDA IN SECOND TRIMESTER (HHS-HCC): ICD-10-CM

## 2025-02-20 DIAGNOSIS — Z3A.15 15 WEEKS GESTATION OF PREGNANCY (HHS-HCC): Primary | ICD-10-CM

## 2025-02-20 LAB — PREGNANCY TEST URINE, POC: POSITIVE

## 2025-02-20 PROCEDURE — 99214 OFFICE O/P EST MOD 30 MIN: CPT | Performed by: ADVANCED PRACTICE MIDWIFE

## 2025-02-20 PROCEDURE — H1000 PRENATAL CARE ATRISK ASSESSM: HCPCS | Performed by: ADVANCED PRACTICE MIDWIFE

## 2025-02-20 PROCEDURE — 81025 URINE PREGNANCY TEST: CPT | Performed by: ADVANCED PRACTICE MIDWIFE

## 2025-02-20 ASSESSMENT — EDINBURGH POSTNATAL DEPRESSION SCALE (EPDS)
I HAVE FELT SAD OR MISERABLE: NOT VERY OFTEN
THE THOUGHT OF HARMING MYSELF HAS OCCURRED TO ME: NEVER
TOTAL SCORE: 9
THINGS HAVE BEEN GETTING ON TOP OF ME: NO, MOST OF THE TIME I HAVE COPED QUITE WELL
I HAVE BLAMED MYSELF UNNECESSARILY WHEN THINGS WENT WRONG: YES, SOME OF THE TIME
I HAVE BEEN ANXIOUS OR WORRIED FOR NO GOOD REASON: YES, SOMETIMES
I HAVE BEEN SO UNHAPPY THAT I HAVE BEEN CRYING: ONLY OCCASIONALLY
I HAVE FELT SCARED OR PANICKY FOR NO GOOD REASON: YES, SOMETIMES
I HAVE BEEN SO UNHAPPY THAT I HAVE HAD DIFFICULTY SLEEPING: NOT AT ALL
I HAVE LOOKED FORWARD WITH ENJOYMENT TO THINGS: AS MUCH AS I EVER DID
I HAVE BEEN ABLE TO LAUGH AND SEE THE FUNNY SIDE OF THINGS: AS MUCH AS I ALWAYS COULD

## 2025-02-20 NOTE — PROGRESS NOTES
Patient presents for NOB visit with  Carlos.     This was an unplanned pregnancy, and patient is feeling thumbs up.  Complaints: none    Work: from home  Feels safe at home: yes    1. Routine PNC, patient appropriate for midwifery service. Patient oriented to practice, including available collaboration and consulting with physicians.   Prenatal navigator findings reviewed.  Discussed and ordered routine OB labs including serum STI/HIV, CBC and blood type and screen.  Pap: 2024 nml  LMP: 2024.  EPDS: 9  Education provided r/t nutrition, folic acid supplementation, dietary guidelines, exercise, smoking, alcohol, caffeine and drug use. Healthy weight gain in pregnancy discussed.    2. Pregnancy Hx  Reviewed and significant for: Term uncomplicated SVBs   38w 7 lb 7 oz   38w 7 lb 5 oz   39w 9 lb 6 oz    3. Obesity in pregnancy:   BMI 40.32. Hemoglobin A1C ordered. Limiting weight gain to 11-20lbs through healthy eating habits and exercise reviewed.   surveillance for BMI 40+ is Ultrasound at 30 & 36 weeks. BPP or NST weekly 34wks to delivery.  For BMI 35+ Recommendation for delivery [id number].    4. Genetic screening:  Genetic carrier screening discussed/offered and patient declined.   Chromosomal anomaly screening discussed/offered and patient declined.   Included in counseling that only diagnostic testing is CVS or amniocentesis.    5. Preeclampsia risk: yes   ASA prophylaxis: patient has preeclampsia risk factors including obesity   Recommendation will be made to start 162mg ASA daily at 12-16 weeks.    6. Vaccination in early pregnancy:  Recommendation for Influenza and COVID vaccine discussed. Patient aware of increased risk of disease and demonstrated safety of vaccination during pregnancy.  Pt received  flu vaccine.  Pt declined COVID vaccine.    7. Medical History Significant for:  Denies     Warning s/s discussed and SAB precautions reviewed.   RTC 4wks/prn   Patient to schedule  Anatomy ultrasound  All new OB labs ordered 2/20  Continue prenatal vitamins   Start ASA  Next visit PE and follow up EPDS

## 2025-02-22 LAB
BACTERIA UR CULT: NORMAL
C TRACH RRNA SPEC QL NAA+PROBE: NOT DETECTED
N GONORRHOEA RRNA SPEC QL NAA+PROBE: NOT DETECTED
QUEST GC CT AMPLIFIED (ALWAYS MESSAGE): NORMAL
T VAGINALIS RRNA SPEC QL NAA+PROBE: NOT DETECTED

## 2025-03-20 ENCOUNTER — APPOINTMENT (OUTPATIENT)
Dept: OBSTETRICS AND GYNECOLOGY | Facility: CLINIC | Age: 25
End: 2025-03-20
Payer: COMMERCIAL

## 2025-03-20 VITALS — SYSTOLIC BLOOD PRESSURE: 109 MMHG | BODY MASS INDEX: 42.71 KG/M2 | DIASTOLIC BLOOD PRESSURE: 75 MMHG | WEIGHT: 248.8 LBS

## 2025-03-20 DIAGNOSIS — O99.011 ANEMIA AFFECTING PREGNANCY IN FIRST TRIMESTER: ICD-10-CM

## 2025-03-20 DIAGNOSIS — Z3A.19 19 WEEKS GESTATION OF PREGNANCY (HHS-HCC): Primary | ICD-10-CM

## 2025-03-20 DIAGNOSIS — O99.212 OBESITY AFFECTING PREGNANCY IN SECOND TRIMESTER, UNSPECIFIED OBESITY TYPE (HHS-HCC): ICD-10-CM

## 2025-03-20 DIAGNOSIS — Z34.82 NORMAL PREGNANCY IN MULTIGRAVIDA IN SECOND TRIMESTER (HHS-HCC): ICD-10-CM

## 2025-03-20 DIAGNOSIS — O46.92 VAGINAL BLEEDING IN PREGNANCY, SECOND TRIMESTER (HHS-HCC): ICD-10-CM

## 2025-03-20 PROCEDURE — 99213 OFFICE O/P EST LOW 20 MIN: CPT | Performed by: ADVANCED PRACTICE MIDWIFE

## 2025-03-20 NOTE — PROGRESS NOTES
Ob Follow-up  3/20/25    SUBJECTIVE      HPI: Anya De Anda is a 24 y.o.  at 19w6d here for RPNV.  She has no contractions,  no bleeding, or no LOF. Reports normal fetal movement. Patient reports feeling good. Denies vaginal concerns.      Pap: 2024 nml    OBJECTIVE  Visit Vitals  /75   Wt 113 kg (248 lb 12.8 oz)   LMP 2024 (Exact Date)   BMI 42.71 kg/m²   OB Status Pregnant   Smoking Status Never   BSA 2.26 m²            ASSESSMENT & PLAN    Anya De Anda is a 24 y.o.  at 19w6d here for the following concerns we addressed today:    Problem List Items Addressed This Visit       Vaginal bleeding in pregnancy, second trimester ([facility])    Obesity affecting pregnancy in second trimester ([facility])    Overview     Weekly NSTs at 34 weeks  Growth U/S at 30 and 36 weeks  IOL 39w         Anemia affecting pregnancy in first trimester ([facility])    Overview     Initial hgb 11.7         19 weeks gestation of pregnancy ([facility]) - Primary    Overview     Desired provider in labor: [x] CNM  [] Physician   [] Either Acceptable  [x] Blood Products: [x] Yes, accepts [] No, needs counseling  [x] Initial BMI: Could not be calculated   [] Prenatal Labs: ordered 25  [x] Cervical Cancer Screening up to date 24 nml  [x] Rh status: O POS   [] Screen for IPV and Substance Use Risk:  [x] Genetic Screening (cfDNA):  declined  [] First Trimester Anatomy Screen (11-13.6 wks): missed   [x] Baby ASA (initiated): Yes  [x] Pregnancy dated by: LMP 24    [] Anatomy US: (19-20 wks) 3/21/25  [] Federal Sterilization consent signed (if indicated):  [] 1hr GCT at 24-28wks:  [] Rhogam (if indicated):   [] Fetal Surveillance (if indicated):  [] Tdap (27-32 wks, may be given up to 36 wks if initial window missed):   [] RSV (32-36 wks) (Sept. to end ):     [] Feeding Intentions:  [] Postpartum Birth control method:   [] GBS at 36 - 37 wks:  [] 39 weeks discussion of IOL vs. Expectant management:  []  Mode of delivery ( anticipated ):          Normal pregnancy in multigravida in second trimester ([facility])         No orders of the defined types were placed in this encounter.       Discussion:  Prenatal labs    RTC in 4 weeks      MAKENNA Cavazos

## 2025-03-21 ENCOUNTER — HOSPITAL ENCOUNTER (OUTPATIENT)
Dept: RADIOLOGY | Facility: CLINIC | Age: 25
Discharge: HOME | End: 2025-03-21
Payer: COMMERCIAL

## 2025-03-21 DIAGNOSIS — Z3A.15 15 WEEKS GESTATION OF PREGNANCY (HHS-HCC): ICD-10-CM

## 2025-03-21 PROCEDURE — 76811 OB US DETAILED SNGL FETUS: CPT

## 2025-04-17 ENCOUNTER — APPOINTMENT (OUTPATIENT)
Dept: OBSTETRICS AND GYNECOLOGY | Facility: CLINIC | Age: 25
End: 2025-04-17
Payer: COMMERCIAL

## 2025-04-24 ENCOUNTER — LAB (OUTPATIENT)
Dept: LAB | Facility: HOSPITAL | Age: 25
End: 2025-04-24
Payer: COMMERCIAL

## 2025-04-24 LAB
ABO GROUP (TYPE) IN BLOOD: NORMAL
ANTIBODY SCREEN: NORMAL
ERYTHROCYTE [DISTWIDTH] IN BLOOD BY AUTOMATED COUNT: 14.8 % (ref 11.5–14.5)
HCT VFR BLD AUTO: 40.4 % (ref 36–46)
HGB BLD-MCNC: 12.7 G/DL (ref 12–16)
MCH RBC QN AUTO: 27.7 PG (ref 26–34)
MCHC RBC AUTO-ENTMCNC: 31.4 G/DL (ref 32–36)
MCV RBC AUTO: 88 FL (ref 80–100)
NRBC BLD-RTO: 0 /100 WBCS (ref 0–0)
PLATELET # BLD AUTO: 234 X10*3/UL (ref 150–450)
RBC # BLD AUTO: 4.59 X10*6/UL (ref 4–5.2)
REFLEX ADDED, ANEMIA PANEL: NORMAL
RH FACTOR (ANTIGEN D): NORMAL
WBC # BLD AUTO: 8.9 X10*3/UL (ref 4.4–11.3)

## 2025-04-24 PROCEDURE — 83021 HEMOGLOBIN CHROMOTOGRAPHY: CPT

## 2025-04-24 PROCEDURE — 83020 HEMOGLOBIN ELECTROPHORESIS: CPT

## 2025-04-24 PROCEDURE — 85027 COMPLETE CBC AUTOMATED: CPT

## 2025-04-24 PROCEDURE — 86850 RBC ANTIBODY SCREEN: CPT

## 2025-04-24 PROCEDURE — 86901 BLOOD TYPING SEROLOGIC RH(D): CPT

## 2025-04-24 PROCEDURE — 86900 BLOOD TYPING SEROLOGIC ABO: CPT

## 2025-04-25 LAB
EST. AVERAGE GLUCOSE BLD GHB EST-MCNC: 103 MG/DL
EST. AVERAGE GLUCOSE BLD GHB EST-SCNC: 5.7 MMOL/L
HBA1C MFR BLD: 5.2 %
HBV SURFACE AG SERPL QL IA: NORMAL
HCV AB SERPL QL IA: NORMAL
HEMOGLOBIN A2: 2.8 % (ref 2–3.5)
HEMOGLOBIN A: 96.8 % (ref 95.8–98)
HEMOGLOBIN F: 0.4 % (ref 0–2)
HEMOGLOBIN IDENTIFICATION INTERPRETATION: NORMAL
HIV 1+2 AB+HIV1 P24 AG SERPL QL IA: NORMAL
PATH REVIEW-HGB IDENTIFICATION: NORMAL
RUBV IGG SERPL IA-ACNC: <0.9 INDEX
T PALLIDUM AB SER QL IA: NORMAL

## 2025-04-28 LAB
EST. AVERAGE GLUCOSE BLD GHB EST-MCNC: 103 MG/DL
EST. AVERAGE GLUCOSE BLD GHB EST-SCNC: 5.7 MMOL/L
HBA1C MFR BLD: 5.2 %
HBV SURFACE AG SERPL QL IA: NORMAL
HCV AB SERPL QL IA: NORMAL
HIV 1+2 AB+HIV1 P24 AG SERPL QL IA: NORMAL
RUBV IGG SERPL IA-ACNC: <0.9 INDEX
T PALLIDUM AB SER QL IA: NEGATIVE

## 2025-05-15 ENCOUNTER — APPOINTMENT (OUTPATIENT)
Dept: OBSTETRICS AND GYNECOLOGY | Facility: CLINIC | Age: 25
End: 2025-05-15
Payer: COMMERCIAL

## 2025-05-29 ENCOUNTER — APPOINTMENT (OUTPATIENT)
Dept: OBSTETRICS AND GYNECOLOGY | Facility: CLINIC | Age: 25
End: 2025-05-29
Payer: COMMERCIAL

## 2025-05-29 VITALS — DIASTOLIC BLOOD PRESSURE: 71 MMHG | WEIGHT: 257.8 LBS | SYSTOLIC BLOOD PRESSURE: 110 MMHG | BODY MASS INDEX: 44.25 KG/M2

## 2025-05-29 DIAGNOSIS — Z3A.29 29 WEEKS GESTATION OF PREGNANCY (HHS-HCC): Primary | ICD-10-CM

## 2025-05-29 DIAGNOSIS — F32.A DEPRESSIVE DISORDER: ICD-10-CM

## 2025-05-29 DIAGNOSIS — Z34.82 NORMAL PREGNANCY IN MULTIGRAVIDA IN SECOND TRIMESTER (HHS-HCC): ICD-10-CM

## 2025-05-29 DIAGNOSIS — E66.89 OTHER OBESITY AFFECTING PREGNANCY IN SECOND TRIMESTER: ICD-10-CM

## 2025-05-29 DIAGNOSIS — O99.212 OTHER OBESITY AFFECTING PREGNANCY IN SECOND TRIMESTER: ICD-10-CM

## 2025-05-29 LAB
ERYTHROCYTE [DISTWIDTH] IN BLOOD BY AUTOMATED COUNT: 14.2 % (ref 11.5–14.5)
HCT VFR BLD AUTO: 39.1 % (ref 36–46)
HGB BLD-MCNC: 12.7 G/DL (ref 12–16)
MCH RBC QN AUTO: 28.4 PG (ref 26–34)
MCHC RBC AUTO-ENTMCNC: 32.5 G/DL (ref 32–36)
MCV RBC AUTO: 88 FL (ref 80–100)
NRBC BLD-RTO: 0 /100 WBCS (ref 0–0)
PLATELET # BLD AUTO: 242 X10*3/UL (ref 150–450)
RBC # BLD AUTO: 4.47 X10*6/UL (ref 4–5.2)
WBC # BLD AUTO: 9.8 X10*3/UL (ref 4.4–11.3)

## 2025-05-29 PROCEDURE — 90715 TDAP VACCINE 7 YRS/> IM: CPT | Performed by: ADVANCED PRACTICE MIDWIFE

## 2025-05-29 PROCEDURE — 90471 IMMUNIZATION ADMIN: CPT | Performed by: ADVANCED PRACTICE MIDWIFE

## 2025-05-29 PROCEDURE — 85027 COMPLETE CBC AUTOMATED: CPT

## 2025-05-29 PROCEDURE — 99213 OFFICE O/P EST LOW 20 MIN: CPT | Performed by: ADVANCED PRACTICE MIDWIFE

## 2025-05-29 RX ORDER — CITALOPRAM 20 MG/1
20 TABLET ORAL DAILY
Qty: 30 TABLET | Refills: 2 | Status: SHIPPED | OUTPATIENT
Start: 2025-05-29 | End: 2025-06-28

## 2025-05-29 ASSESSMENT — EDINBURGH POSTNATAL DEPRESSION SCALE (EPDS)
I HAVE LOOKED FORWARD WITH ENJOYMENT TO THINGS: RATHER LESS THAN I USED TO
TOTAL SCORE: 19
I HAVE BLAMED MYSELF UNNECESSARILY WHEN THINGS WENT WRONG: YES, MOST OF THE TIME
I HAVE BEEN ANXIOUS OR WORRIED FOR NO GOOD REASON: YES, SOMETIMES
I HAVE BEEN SO UNHAPPY THAT I HAVE BEEN CRYING: YES, QUITE OFTEN
I HAVE FELT SCARED OR PANICKY FOR NO GOOD REASON: YES, SOMETIMES
I HAVE FELT SAD OR MISERABLE: YES, MOST OF THE TIME
THINGS HAVE BEEN GETTING ON TOP OF ME: YES, SOMETIMES I HAVEN'T BEEN COPING AS WELL AS USUAL
I HAVE BEEN ABLE TO LAUGH AND SEE THE FUNNY SIDE OF THINGS: NOT QUITE SO MUCH NOW
I HAVE BEEN SO UNHAPPY THAT I HAVE HAD DIFFICULTY SLEEPING: YES, MOST OF THE TIME
THE THOUGHT OF HARMING MYSELF HAS OCCURRED TO ME: NEVER

## 2025-05-29 NOTE — PROGRESS NOTES
Ob Follow-up  25     SUBJECTIVE      HPI: Anya De Anda is a 24 y.o.  at 29w6d here for RPNV.  She has no contractions,  no bleeding, or no LOF. Reports normal fetal movement. Patient reports feeling well.       OBJECTIVE  Visit Vitals  /71   Wt 117 kg (257 lb 12.8 oz)   LMP 2024 (Exact Date)   BMI 44.25 kg/m²   OB Status Pregnant   Smoking Status Never   BSA 2.3 m²            ASSESSMENT & PLAN    Anya De Anda is a 24 y.o.  at 29w6d here for the following concerns we addressed today:    Problem List Items Addressed This Visit       Obesity affecting pregnancy in second trimester ([facility])    Overview   Weekly NSTs at 34 weeks  Growth U/S at 30 and 36 weeks  IOL 39w         Depressive disorder    Overview   Counseling weekly  Has had some poor experiences with medication, one that worked best was Celexa  Rx Celexa 25, EPDS 19, follow up 2 weeks; call 911 for SI/HI         Relevant Medications    citalopram (CeleXA) 20 mg tablet    29 weeks gestation of pregnancy ([facility]) - Primary    Overview   Desired provider in labor: [x] CNM  [] Physician   [] Either Acceptable  [x] Blood Products: [x] Yes, accepts [] No, needs counseling  [x] Initial BMI: Could not be calculated   [x] Prenatal Labs: Rubella non immune  [x] Cervical Cancer Screening up to date 24 nml  [x] Rh status: O POS   [] Screen for IPV and Substance Use Risk:  [x] Genetic Screening (cfDNA):  declined  [] First Trimester Anatomy Screen (11-13.6 wks): missed   [x] Baby ASA (initiated): Yes  [x] Pregnancy dated by: LMP 24    [x] Anatomy US: (19-20 wks) WNL  [] Federal Sterilization consent signed (if indicated):  [] 1hr GCT at 24-28wks: Draw 25  [] Rhogam (if indicated):   [] Fetal Surveillance (if indicated):  [x] Tdap (27-32 wks, may be given up to 36 wks if initial window missed): 25  [] RSV (32-36 wks) (Sept. to end of ):     [] Feeding Intentions:  [] Postpartum Birth control method:   []  GBS at 36 - 37 wks:  [] 39 weeks discussion of IOL vs. Expectant management:  [] Mode of delivery ( anticipated ):          Relevant Orders    Tdap vaccine, age 7 years and older  (BOOSTRIX)    CBC Anemia Panel With Reflex,Pregnancy    Glucose, 1 Hour Screen, Pregnancy    Normal pregnancy in multigravida in second trimester ([facility])         Orders Placed This Encounter   Procedures    Tdap vaccine, age 7 years and older  (BOOSTRIX)    CBC Anemia Panel With Reflex,Pregnancy     Release result to MyChart:   Immediate [1]    Glucose, 1 Hour Screen, Pregnancy     Release result to MyChart:   Immediate [1]    CBC     Release result to MyChart:   Immediate [1]    CBC Anemia Panel with Reflex, Pregnancy     Release result to MyChart:   Immediate [1]        Discussion:      RTC in 2 weeks      MAKENNA Cavazos

## 2025-05-30 ENCOUNTER — HOSPITAL ENCOUNTER (OUTPATIENT)
Dept: RADIOLOGY | Facility: CLINIC | Age: 25
Discharge: HOME | End: 2025-05-30
Payer: COMMERCIAL

## 2025-05-30 DIAGNOSIS — Z3A.15 15 WEEKS GESTATION OF PREGNANCY (HHS-HCC): ICD-10-CM

## 2025-05-30 LAB
GLUCOSE 1H P 50 G GLC PO SERPL-MCNC: 141 MG/DL
REFLEX ADDED, ANEMIA PANEL: NORMAL

## 2025-05-30 PROCEDURE — 76819 FETAL BIOPHYS PROFIL W/O NST: CPT

## 2025-05-30 PROCEDURE — 76816 OB US FOLLOW-UP PER FETUS: CPT

## 2025-06-03 DIAGNOSIS — R73.02 GLUCOSE INTOLERANCE (IMPAIRED GLUCOSE TOLERANCE): Primary | ICD-10-CM

## 2025-06-03 NOTE — RESULT ENCOUNTER NOTE
Please CALL patient to follow up that her glucose screen was elevated, instruct 3hr glucose testing, order placed

## 2025-06-12 ENCOUNTER — APPOINTMENT (OUTPATIENT)
Dept: OBSTETRICS AND GYNECOLOGY | Facility: CLINIC | Age: 25
End: 2025-06-12
Payer: COMMERCIAL

## 2025-06-12 VITALS — BODY MASS INDEX: 44.18 KG/M2 | SYSTOLIC BLOOD PRESSURE: 107 MMHG | DIASTOLIC BLOOD PRESSURE: 69 MMHG | WEIGHT: 257.4 LBS

## 2025-06-12 DIAGNOSIS — Z34.83 NORMAL PREGNANCY IN MULTIGRAVIDA IN THIRD TRIMESTER (HHS-HCC): ICD-10-CM

## 2025-06-12 DIAGNOSIS — O99.213 OBESITY AFFECTING PREGNANCY IN THIRD TRIMESTER, UNSPECIFIED OBESITY TYPE (HHS-HCC): Primary | ICD-10-CM

## 2025-06-12 DIAGNOSIS — F32.A DEPRESSIVE DISORDER: ICD-10-CM

## 2025-06-12 DIAGNOSIS — Z3A.31 31 WEEKS GESTATION OF PREGNANCY (HHS-HCC): ICD-10-CM

## 2025-06-12 PROCEDURE — 99213 OFFICE O/P EST LOW 20 MIN: CPT | Performed by: ADVANCED PRACTICE MIDWIFE

## 2025-06-12 NOTE — PROGRESS NOTES
Ob Follow-up  25     SUBJECTIVE      HPI: Anya De Anda is a 25 y.o.  at 31w6d here for RPNV.  She has no contractions,  no bleeding, or no LOF. Reports normal fetal movement. Patient reports no concerns    Tried to go lab for 3hr and said they were unable to complete due to staffing. Plans to try again this weekend.    OBJECTIVE  Visit Vitals  /69   Wt 117 kg (257 lb 6.4 oz)   LMP 2024 (Exact Date)   BMI 44.18 kg/m²   OB Status Pregnant   Smoking Status Never   BSA 2.3 m²            ASSESSMENT & PLAN    Anya De Anda is a 25 y.o.  at 31w6d here for the following concerns we addressed today:    Problem List Items Addressed This Visit       Obesity affecting pregnancy in third trimester ([facility]) - Primary    Overview   Weekly NSTs at 34 weeks  Growth U/S at 30 and 36 weeks  25 EFW 67%ile  IOL 39w         Depressive disorder    Overview   Counseling weekly  Has had some poor experiences with medication, one that worked best was Celexa  Rx Celexa 25, EPDS 19, follow up 2 weeks; call 911 for SI/HI    25 Improved and doing well on Celexa 20mg daily         31 weeks gestation of pregnancy ([facility])    Overview   Desired provider in labor: [x] CNM  [] Physician   [] Either Acceptable  [x] Blood Products: [x] Yes, accepts [] No, needs counseling  [x] Initial BMI: Could not be calculated   [x] Prenatal Labs: Rubella non immune  [x] Cervical Cancer Screening up to date 24 nml  [x] Rh status: O POS   [] Screen for IPV and Substance Use Risk:  [x] Genetic Screening (cfDNA):  declined  [] First Trimester Anatomy Screen (11-13.6 wks): missed   [x] Baby ASA (initiated): Yes  [x] Pregnancy dated by: LMP 24    [x] Anatomy US: (19-20 wks) WNL  [] Federal Sterilization consent signed (if indicated):  [x] 1hr GCT at 24-28wks: 1hr =141, plans 3hr GTT  [] Rhogam (if indicated):   [] Fetal Surveillance (if indicated): See Obesity  [x] Tdap (27-32 wks, may be given up to 36 wks  if initial window missed): 5/29/25    [] Feeding Intentions:  [] Postpartum Birth control method:   [] GBS at 36 - 37 wks:  [] 39 weeks discussion of IOL vs. Expectant management:  [] Mode of delivery ( anticipated ):          Normal pregnancy in multigravida in third trimester ([facility])         No orders of the defined types were placed in this encounter.       Discussion:      RTC in 2 weeks  Next visit NST, fill out birth preferences and discuss IOL    MAKENNA Cavazos

## 2025-06-18 LAB
GLUCOSE 1H P CHAL SERPL-MCNC: 163 MG/DL
GLUCOSE 2H P CHAL SERPL-MCNC: 131 MG/DL
GLUCOSE 3H P 100 G GLC PO SERPL-MCNC: 88 MG/DL
GLUCOSE P FAST SERPL-MCNC: 89 MG/DL (ref 65–94)
SERVICE CMNT-IMP: NORMAL

## 2025-06-26 ENCOUNTER — APPOINTMENT (OUTPATIENT)
Dept: OBSTETRICS AND GYNECOLOGY | Facility: CLINIC | Age: 25
End: 2025-06-26
Payer: COMMERCIAL

## 2025-06-26 ENCOUNTER — PREP FOR PROCEDURE (OUTPATIENT)
Dept: OBSTETRICS AND GYNECOLOGY | Facility: CLINIC | Age: 25
End: 2025-06-26

## 2025-06-26 VITALS — WEIGHT: 260.2 LBS | SYSTOLIC BLOOD PRESSURE: 104 MMHG | DIASTOLIC BLOOD PRESSURE: 69 MMHG | BODY MASS INDEX: 44.66 KG/M2

## 2025-06-26 DIAGNOSIS — Z34.83 NORMAL PREGNANCY IN MULTIGRAVIDA IN THIRD TRIMESTER (HHS-HCC): ICD-10-CM

## 2025-06-26 DIAGNOSIS — O46.92 VAGINAL BLEEDING IN PREGNANCY, SECOND TRIMESTER (HHS-HCC): ICD-10-CM

## 2025-06-26 DIAGNOSIS — Z3A.33 33 WEEKS GESTATION OF PREGNANCY (HHS-HCC): ICD-10-CM

## 2025-06-26 DIAGNOSIS — O99.213 OBESITY AFFECTING PREGNANCY IN THIRD TRIMESTER, UNSPECIFIED OBESITY TYPE (HHS-HCC): Primary | ICD-10-CM

## 2025-06-26 PROCEDURE — 99213 OFFICE O/P EST LOW 20 MIN: CPT | Performed by: ADVANCED PRACTICE MIDWIFE

## 2025-06-26 PROCEDURE — 59025 FETAL NON-STRESS TEST: CPT | Performed by: ADVANCED PRACTICE MIDWIFE

## 2025-06-26 NOTE — PROCEDURES
Anya De Anda, a  at 33w6d with an MAYELIN of 2025, by Last Menstrual Period, was seen at [facility] for a nonstress test.    Non-Stress Test   Baseline Fetal Heart Rate for Non-Stress Test: 135 BPM  Variability in Waveform for Non-Stress Test: Moderate  Accelerations in Non-Stress Test: Yes  Decelerations in Non-Stress Test: None  Contractions in Non-Stress Test: Not present  Acoustic Stimulator for Non-Stress Test: No  Interpretation of Non-Stress Test   Interpretation of Non-Stress Test: Reactive

## 2025-06-26 NOTE — PROGRESS NOTES
Ob Follow-up  25     SUBJECTIVE      HPI: Anya De Anda is a 25 y.o.  at 33w6d here for RPNV.  She has no contractions,  no bleeding, or no LOF. Reports normal fetal movement. Patient reports no concerns.       OBJECTIVE  Visit Vitals  /69   Wt 118 kg (260 lb 3.2 oz)   LMP 2024 (Exact Date)   BMI 44.66 kg/m²   OB Status Pregnant   Smoking Status Never   BSA 2.31 m²            ASSESSMENT & PLAN    Anya De Anda is a 25 y.o.  at 33w6d here for the following concerns we addressed today:    Problem List Items Addressed This Visit       Vaginal bleeding in pregnancy, second trimester ([facility])    Obesity affecting pregnancy in third trimester ([facility]) - Primary    Overview   Weekly NSTs at 34 weeks  Growth U/S at 30 and 36 weeks  25 EFW 67%ile  IOL 39w, scheduled 25         Relevant Orders    Fetal nonstress test (Completed)    33 weeks gestation of pregnancy ([facility])    Overview   Desired provider in labor: [x] CNM  [] Physician   [] Either Acceptable  [x] Blood Products: [x] Yes, accepts [] No, needs counseling  [x] Initial BMI: Could not be calculated   [x] Prenatal Labs: Rubella non immune  [x] Cervical Cancer Screening up to date 24 nml  [x] Rh status: O POS   [] Screen for IPV and Substance Use Risk:  [x] Genetic Screening (cfDNA):  declined  [] First Trimester Anatomy Screen (11-13.6 wks): missed   [x] Baby ASA (initiated): Yes  [x] Pregnancy dated by: LMP 24    [x] Anatomy US: (19-20 wks) WNL  [] Federal Sterilization consent signed (if indicated):  [x] 1hr GCT at 24-28wks: 1hr =141, 3hr GTT WNL  [] Rhogam (if indicated):   [] Fetal Surveillance (if indicated): See Obesity  [x] Tdap (27-32 wks, may be given up to 36 wks if initial window missed): 25    [x] Feeding Intentions: Breast  [x] Postpartum Birth control method: Paragard  [] GBS at 36 - 37 wks:  [x] 39 weeks discussion of IOL vs. Expectant management: IOL 25  [x] Mode of delivery (  anticipated ): SVB         Normal pregnancy in multigravida in third trimester ([facility])         Orders Placed This Encounter   Procedures    Fetal nonstress test        Discussion:  Recommended BPP weekly as NST difficult to obtain in office, pt agreeable; NST reactive today, will have an NST next week due to BPP scheduling conflict then start weekly BPPs    Pt understanding of recommendation for 39w IOL due to risk factor of obesity; pt agreeable to 39w IOL, scheduled    RTC in 1 weeks for NST      MAKENNA Cavazos

## 2025-07-03 ENCOUNTER — APPOINTMENT (OUTPATIENT)
Facility: CLINIC | Age: 25
End: 2025-07-03
Payer: COMMERCIAL

## 2025-07-10 ENCOUNTER — APPOINTMENT (OUTPATIENT)
Dept: OBSTETRICS AND GYNECOLOGY | Facility: CLINIC | Age: 25
End: 2025-07-10
Payer: COMMERCIAL

## 2025-07-10 VITALS — WEIGHT: 259.4 LBS | DIASTOLIC BLOOD PRESSURE: 61 MMHG | SYSTOLIC BLOOD PRESSURE: 95 MMHG | BODY MASS INDEX: 44.53 KG/M2

## 2025-07-10 DIAGNOSIS — F32.A DEPRESSIVE DISORDER: ICD-10-CM

## 2025-07-10 DIAGNOSIS — Z3A.35 35 WEEKS GESTATION OF PREGNANCY (HHS-HCC): ICD-10-CM

## 2025-07-10 DIAGNOSIS — Z34.83 NORMAL PREGNANCY IN MULTIGRAVIDA IN THIRD TRIMESTER (HHS-HCC): Primary | ICD-10-CM

## 2025-07-10 DIAGNOSIS — O46.92 VAGINAL BLEEDING IN PREGNANCY, SECOND TRIMESTER (HHS-HCC): ICD-10-CM

## 2025-07-10 DIAGNOSIS — O99.213 OBESITY AFFECTING PREGNANCY IN THIRD TRIMESTER, UNSPECIFIED OBESITY TYPE (HHS-HCC): ICD-10-CM

## 2025-07-10 NOTE — PROGRESS NOTES
Ob Follow-up  7/10/25    SUBJECTIVE      HPI: Anya De Anda is a 25 y.o.  at 35w6d here for RPNV.  She has no contractions,  no bleeding, or no LOF. Reports normal fetal movement. Patient reports no concerns       OBJECTIVE  Visit Vitals  BP 95/61   Wt 118 kg (259 lb 6.4 oz)   LMP 2024 (Exact Date)   BMI 44.53 kg/m²   OB Status Pregnant   Smoking Status Never   BSA 2.31 m²        ASSESSMENT & PLAN    Anya De Anda is a 25 y.o.  at 35w6d here for the following concerns we addressed today:    Problem List Items Addressed This Visit       Vaginal bleeding in pregnancy, second trimester ([facility])    Obesity affecting pregnancy in third trimester ([facility])    Overview   Weekly NSTs at 34 weeks --> weekly BPP in place of NST  Growth U/S at 30 and 36 weeks  25 EFW 67%ile  IOL 39w, scheduled 25         Depressive disorder    Overview   Counseling weekly  Has had some poor experiences with medication, one that worked best was Celexa  Rx Celexa 25, EPDS 19, follow up 2 weeks; call 911 for SI/HI    25 Improved and doing well on Celexa 20mg daily         35 weeks gestation of pregnancy ([facility])    Overview   Desired provider in labor: [x] CNM  [] Physician   [] Either Acceptable  [x] Blood Products: [x] Yes, accepts [] No, needs counseling  [x] Initial BMI: Could not be calculated   [x] Prenatal Labs: Rubella non immune  [x] Cervical Cancer Screening up to date 24 nml  [x] Rh status: O POS   [] Screen for IPV and Substance Use Risk:  [x] Genetic Screening (cfDNA):  declined  [] First Trimester Anatomy Screen (11-13.6 wks): missed   [x] Baby ASA (initiated): Yes  [x] Pregnancy dated by: LMP 24    [x] Anatomy US: (19-20 wks) WNL  [] Federal Sterilization consent signed (if indicated):  [x] 1hr GCT at 24-28wks: 1hr =141, 3hr GTT WNL  [] Rhogam (if indicated):   [] Fetal Surveillance (if indicated): See Obesity  [x] Tdap (27-32 wks, may be given up to 36 wks if initial window  missed): 25    [x] Feeding Intentions: Breast  [x] Postpartum Birth control method: Paragard  [] GBS at 36 - 37 wks:  [x] 39 weeks discussion of IOL vs. Expectant management: IOL 25  [x] Mode of delivery ( anticipated ): SVB         Relevant Orders    Group B Streptococcus (GBS) Prenatal Screen, Culture    Normal pregnancy in multigravida in third trimester ([facility]) - Primary         Orders Placed This Encounter   Procedures    Group B Streptococcus (GBS) Prenatal Screen, Culture     Does the patient have a Penicillin allergy?:   No     SOURCE::   Vaginal/Rectal     Release result to MyChart:   Immediate [1]        Discussion:      RTC in 1 weeks      MAKENNA Cavazos     The Midwifery Service at [facility] has a Certified Nurse Midwife on call  who is available to discuss any pregnancy related concerns or urgent needs that cannot wait until the next business day.  Please call the office where you are seen at during the day.  On call numbers for after hours are listed below.    At any time you would like to tour our facilities, please call [phone] to set up a tour    If you are  (less than 37) weeks and experience:     More than 5 contractions in an 1 hour period   If you have fluid leaking from your vagina   Bleeding that is as heavy as a period   Decreased fetal movements or changes in your baby's movement after 24 weeks   A headache that does not go away with Tylenol or rest    If you full term (37 weeks or greater) and experience:     Contractions that are 5 minutes a part for 2 hours that you cannot walk or talk through   A gush of fluid from your vagina   Bleeding that is as heavy as a period   Decrease fetal movements or changes in your baby's movements   A headache that does not go away with Tylenol or rest     During the weekday office hours please call the office you are normally seen at.    After hours please call:  For patients planning on birthing at  [facility] or [facility] and need to speak to the midwife on call:  [phone]    For patients planning on birthing at [facility] and need to speak to the the midwife on call:  [phone]      DO NOT USE [employer] MESSAGES - THEY ARE NOT MONITORED 24/7

## 2025-07-11 ENCOUNTER — HOSPITAL ENCOUNTER (OUTPATIENT)
Dept: RADIOLOGY | Facility: CLINIC | Age: 25
Discharge: HOME | End: 2025-07-11
Payer: COMMERCIAL

## 2025-07-11 DIAGNOSIS — O09.93 SUPERVISION OF HIGH RISK PREGNANCY, UNSPECIFIED, THIRD TRIMESTER (HHS-HCC): ICD-10-CM

## 2025-07-11 DIAGNOSIS — Z3A.15 15 WEEKS GESTATION OF PREGNANCY (HHS-HCC): ICD-10-CM

## 2025-07-11 PROCEDURE — 76815 OB US LIMITED FETUS(S): CPT

## 2025-07-11 PROCEDURE — 76819 FETAL BIOPHYS PROFIL W/O NST: CPT

## 2025-07-12 PROBLEM — Z3A.35 35 WEEKS GESTATION OF PREGNANCY (HHS-HCC): Status: ACTIVE | Noted: 2025-02-20

## 2025-07-13 LAB — GP B STREP SPEC QL CULT: NORMAL

## 2025-07-17 ENCOUNTER — APPOINTMENT (OUTPATIENT)
Dept: OBSTETRICS AND GYNECOLOGY | Facility: CLINIC | Age: 25
End: 2025-07-17
Payer: COMMERCIAL

## 2025-07-17 VITALS — SYSTOLIC BLOOD PRESSURE: 108 MMHG | WEIGHT: 264 LBS | DIASTOLIC BLOOD PRESSURE: 71 MMHG | BODY MASS INDEX: 45.32 KG/M2

## 2025-07-17 DIAGNOSIS — Z3A.36 36 WEEKS GESTATION OF PREGNANCY (HHS-HCC): Primary | ICD-10-CM

## 2025-07-17 DIAGNOSIS — O99.213 OBESITY AFFECTING PREGNANCY IN THIRD TRIMESTER, UNSPECIFIED OBESITY TYPE (HHS-HCC): ICD-10-CM

## 2025-07-17 PROBLEM — Z34.92 ENCOUNTER FOR SUPERVISION OF LOW-RISK PREGNANCY IN SECOND TRIMESTER: Status: RESOLVED | Noted: 2024-03-01 | Resolved: 2025-07-17

## 2025-07-17 PROCEDURE — 99214 OFFICE O/P EST MOD 30 MIN: CPT | Performed by: OBSTETRICS & GYNECOLOGY

## 2025-07-17 NOTE — PROGRESS NOTES
Ob Visit  25     SUBJECTIVE      HPI: Anya De Anda is a 25 y.o.  at 36w6d here for RPNV.  Does not endorse contractions, leakage of fluid or vaginal bleeding.  Endorses good fetal movement. Has US growth tomorrow     OBJECTIVE  Visit Vitals  /71   Wt 120 kg (264 lb)   LMP 2024 (Exact Date)   BMI 45.32 kg/m²   OB Status Pregnant   Smoking Status Never   BSA 2.33 m²            ASSESSMENT & PLAN    Anya De Anda is a 25 y.o.  at 36w6d here for the following concerns we addressed today:    Problem List Items Addressed This Visit       Obesity affecting pregnancy in third trimester ([facility])    Overview   Weekly NSTs at 34 weeks --> weekly BPP in place of NST  Growth U/S at 30 and 36 weeks  25 EFW 67%ile  IOL 39w, scheduled 25         36 weeks gestation of pregnancy ([facility]) - Primary    Overview   Desired provider in labor: [x] CNM  [] Physician   [] Either Acceptable  [x] Blood Products: [x] Yes, accepts [] No, needs counseling  [x] Initial BMI: Could not be calculated   [x] Prenatal Labs: Rubella non immune  [x] Cervical Cancer Screening up to date 24 nml  [x] Rh status: O POS   [] Screen for IPV and Substance Use Risk:  [x] Genetic Screening (cfDNA):  declined  [] First Trimester Anatomy Screen (11-13.6 wks): missed   [x] Baby ASA (initiated): Yes  [x] Pregnancy dated by: LMP 24    [x] Anatomy US: (19-20 wks) WNL  [] Federal Sterilization consent signed (if indicated):  [x] 1hr GCT at 24-28wks: 1hr =141, 3hr GTT WNL  [] Rhogam (if indicated):   [] Fetal Surveillance (if indicated): See Obesity  [x] Tdap (27-32 wks, may be given up to 36 wks if initial window missed): 25    [x] Feeding Intentions: Breast  [x] Postpartum Birth control method: Paragard  [x] GBS at 36 - 37 wks: Neg  [x] 39 weeks discussion of IOL vs. Expectant management: IOL 25  [x] Mode of delivery ( anticipated ): SVB              RTC in 1 weeks      Cristela Aparicio MD

## 2025-07-18 ENCOUNTER — HOSPITAL ENCOUNTER (OUTPATIENT)
Dept: RADIOLOGY | Facility: CLINIC | Age: 25
Discharge: HOME | End: 2025-07-18
Payer: COMMERCIAL

## 2025-07-18 DIAGNOSIS — O99.213 OBESITY COMPLICATING PREGNANCY, THIRD TRIMESTER (HHS-HCC): ICD-10-CM

## 2025-07-18 DIAGNOSIS — Z3A.15 15 WEEKS GESTATION OF PREGNANCY (HHS-HCC): ICD-10-CM

## 2025-07-18 PROCEDURE — 76819 FETAL BIOPHYS PROFIL W/O NST: CPT

## 2025-07-18 PROCEDURE — 76816 OB US FOLLOW-UP PER FETUS: CPT

## 2025-07-24 ENCOUNTER — APPOINTMENT (OUTPATIENT)
Dept: OBSTETRICS AND GYNECOLOGY | Facility: CLINIC | Age: 25
End: 2025-07-24
Payer: COMMERCIAL

## 2025-07-24 ENCOUNTER — HOSPITAL ENCOUNTER (OUTPATIENT)
Dept: RADIOLOGY | Facility: CLINIC | Age: 25
Discharge: HOME | End: 2025-07-24
Payer: COMMERCIAL

## 2025-07-24 DIAGNOSIS — Z3A.15 15 WEEKS GESTATION OF PREGNANCY (HHS-HCC): ICD-10-CM

## 2025-07-24 PROCEDURE — 76815 OB US LIMITED FETUS(S): CPT

## 2025-07-24 PROCEDURE — 76819 FETAL BIOPHYS PROFIL W/O NST: CPT

## 2025-07-28 ENCOUNTER — HOSPITAL ENCOUNTER (INPATIENT)
Facility: HOSPITAL | Age: 25
LOS: 1 days | Discharge: HOME | End: 2025-07-29
Attending: OBSTETRICS & GYNECOLOGY | Admitting: OBSTETRICS & GYNECOLOGY
Payer: COMMERCIAL

## 2025-07-28 DIAGNOSIS — R52 POSTPARTUM PAIN (HHS-HCC): Primary | ICD-10-CM

## 2025-07-28 PROBLEM — R05.8 COUGH ON EXERCISE: Status: RESOLVED | Noted: 2024-06-20 | Resolved: 2025-07-28

## 2025-07-28 PROBLEM — Z3A.36 36 WEEKS GESTATION OF PREGNANCY (HHS-HCC): Status: RESOLVED | Noted: 2025-02-20 | Resolved: 2025-07-28

## 2025-07-28 PROBLEM — J45.909 ASTHMA: Status: RESOLVED | Noted: 2024-06-20 | Resolved: 2025-07-28

## 2025-07-28 PROBLEM — O99.213 OBESITY AFFECTING PREGNANCY IN THIRD TRIMESTER (HHS-HCC): Status: RESOLVED | Noted: 2024-03-12 | Resolved: 2025-07-28

## 2025-07-28 PROBLEM — O46.92 VAGINAL BLEEDING IN PREGNANCY, SECOND TRIMESTER (HHS-HCC): Status: RESOLVED | Noted: 2024-03-12 | Resolved: 2025-07-28

## 2025-07-28 PROBLEM — O99.011 ANEMIA AFFECTING PREGNANCY IN FIRST TRIMESTER: Status: RESOLVED | Noted: 2024-05-27 | Resolved: 2025-07-28

## 2025-07-28 PROBLEM — Z34.83 NORMAL PREGNANCY IN MULTIGRAVIDA IN THIRD TRIMESTER (HHS-HCC): Status: RESOLVED | Noted: 2025-02-20 | Resolved: 2025-07-28

## 2025-07-28 LAB
ABO GROUP (TYPE) IN BLOOD: NORMAL
ALBUMIN SERPL BCP-MCNC: 3.1 G/DL (ref 3.4–5)
ALP SERPL-CCNC: 90 U/L (ref 33–110)
ALT SERPL W P-5'-P-CCNC: 12 U/L (ref 7–45)
ANION GAP SERPL CALC-SCNC: 14 MMOL/L (ref 10–20)
ANTIBODY SCREEN: NORMAL
AST SERPL W P-5'-P-CCNC: 15 U/L (ref 9–39)
BASOPHILS # BLD AUTO: 0.03 X10*3/UL (ref 0–0.1)
BASOPHILS NFR BLD AUTO: 0.2 %
BILIRUB SERPL-MCNC: 0.4 MG/DL (ref 0–1.2)
BUN SERPL-MCNC: 6 MG/DL (ref 6–23)
CALCIUM SERPL-MCNC: 8.2 MG/DL (ref 8.6–10.3)
CHLORIDE SERPL-SCNC: 105 MMOL/L (ref 98–107)
CO2 SERPL-SCNC: 20 MMOL/L (ref 21–32)
CREAT SERPL-MCNC: 0.42 MG/DL (ref 0.5–1.05)
EGFRCR SERPLBLD CKD-EPI 2021: >90 ML/MIN/1.73M*2
EOSINOPHIL # BLD AUTO: 0.06 X10*3/UL (ref 0–0.7)
EOSINOPHIL NFR BLD AUTO: 0.5 %
ERYTHROCYTE [DISTWIDTH] IN BLOOD BY AUTOMATED COUNT: 14.4 % (ref 11.5–14.5)
GLUCOSE SERPL-MCNC: 108 MG/DL (ref 74–99)
HCT VFR BLD AUTO: 36.5 % (ref 36–46)
HGB BLD-MCNC: 11.8 G/DL (ref 12–16)
IMM GRANULOCYTES # BLD AUTO: 0.15 X10*3/UL (ref 0–0.7)
IMM GRANULOCYTES NFR BLD AUTO: 1.2 % (ref 0–0.9)
LYMPHOCYTES # BLD AUTO: 2.17 X10*3/UL (ref 1.2–4.8)
LYMPHOCYTES NFR BLD AUTO: 16.8 %
MCH RBC QN AUTO: 27.4 PG (ref 26–34)
MCHC RBC AUTO-ENTMCNC: 32.3 G/DL (ref 32–36)
MCV RBC AUTO: 85 FL (ref 80–100)
MONOCYTES # BLD AUTO: 0.95 X10*3/UL (ref 0.1–1)
MONOCYTES NFR BLD AUTO: 7.4 %
NEUTROPHILS # BLD AUTO: 9.53 X10*3/UL (ref 1.2–7.7)
NEUTROPHILS NFR BLD AUTO: 73.9 %
NRBC BLD-RTO: 0 /100 WBCS (ref 0–0)
PLATELET # BLD AUTO: 231 X10*3/UL (ref 150–450)
POTASSIUM SERPL-SCNC: 3.7 MMOL/L (ref 3.5–5.3)
PROT SERPL-MCNC: 6 G/DL (ref 6.4–8.2)
RBC # BLD AUTO: 4.3 X10*6/UL (ref 4–5.2)
RH FACTOR (ANTIGEN D): NORMAL
SODIUM SERPL-SCNC: 135 MMOL/L (ref 136–145)
TREPONEMA PALLIDUM IGG+IGM AB [PRESENCE] IN SERUM OR PLASMA BY IMMUNOASSAY: NONREACTIVE
WBC # BLD AUTO: 12.9 X10*3/UL (ref 4.4–11.3)

## 2025-07-28 PROCEDURE — 1220000001 HC OB SEMI-PRIVATE ROOM DAILY

## 2025-07-28 PROCEDURE — 84075 ASSAY ALKALINE PHOSPHATASE: CPT | Performed by: ADVANCED PRACTICE MIDWIFE

## 2025-07-28 PROCEDURE — 85025 COMPLETE CBC W/AUTO DIFF WBC: CPT | Performed by: ADVANCED PRACTICE MIDWIFE

## 2025-07-28 PROCEDURE — 86901 BLOOD TYPING SEROLOGIC RH(D): CPT | Performed by: ADVANCED PRACTICE MIDWIFE

## 2025-07-28 PROCEDURE — 2500000004 HC RX 250 GENERAL PHARMACY W/ HCPCS (ALT 636 FOR OP/ED): Mod: JZ | Performed by: ADVANCED PRACTICE MIDWIFE

## 2025-07-28 PROCEDURE — 7100000016 HC LABOR RECOVERY PER HOUR

## 2025-07-28 PROCEDURE — 96372 THER/PROPH/DIAG INJ SC/IM: CPT | Performed by: ADVANCED PRACTICE MIDWIFE

## 2025-07-28 PROCEDURE — 36415 COLL VENOUS BLD VENIPUNCTURE: CPT | Performed by: ADVANCED PRACTICE MIDWIFE

## 2025-07-28 PROCEDURE — 86780 TREPONEMA PALLIDUM: CPT | Mod: STJLAB | Performed by: ADVANCED PRACTICE MIDWIFE

## 2025-07-28 PROCEDURE — 2500000001 HC RX 250 WO HCPCS SELF ADMINISTERED DRUGS (ALT 637 FOR MEDICARE OP): Performed by: ADVANCED PRACTICE MIDWIFE

## 2025-07-28 PROCEDURE — 59409 OBSTETRICAL CARE: CPT | Performed by: ADVANCED PRACTICE MIDWIFE

## 2025-07-28 RX ORDER — DIPHENHYDRAMINE HCL 25 MG
25 TABLET ORAL EVERY 6 HOURS PRN
Status: DISCONTINUED | OUTPATIENT
Start: 2025-07-28 | End: 2025-07-29 | Stop reason: HOSPADM

## 2025-07-28 RX ORDER — OXYTOCIN 10 [USP'U]/ML
INJECTION, SOLUTION INTRAMUSCULAR; INTRAVENOUS CODE/TRAUMA/SEDATION MEDICATION
Status: COMPLETED | OUTPATIENT
Start: 2025-07-28 | End: 2025-07-28

## 2025-07-28 RX ORDER — SIMETHICONE 80 MG
80 TABLET,CHEWABLE ORAL 4 TIMES DAILY PRN
Status: DISCONTINUED | OUTPATIENT
Start: 2025-07-28 | End: 2025-07-29 | Stop reason: HOSPADM

## 2025-07-28 RX ORDER — ACETAMINOPHEN 325 MG/1
975 TABLET ORAL EVERY 6 HOURS SCHEDULED
Status: DISCONTINUED | OUTPATIENT
Start: 2025-07-28 | End: 2025-07-29 | Stop reason: HOSPADM

## 2025-07-28 RX ORDER — ACETAMINOPHEN 160 MG/5ML
650 SOLUTION ORAL ONCE
Status: COMPLETED | OUTPATIENT
Start: 2025-07-28 | End: 2025-07-28

## 2025-07-28 RX ORDER — KETOROLAC TROMETHAMINE 30 MG/ML
INJECTION, SOLUTION INTRAMUSCULAR; INTRAVENOUS CODE/TRAUMA/SEDATION MEDICATION
Status: COMPLETED | OUTPATIENT
Start: 2025-07-28 | End: 2025-07-28

## 2025-07-28 RX ORDER — DIPHENHYDRAMINE HYDROCHLORIDE 50 MG/ML
25 INJECTION, SOLUTION INTRAMUSCULAR; INTRAVENOUS EVERY 6 HOURS PRN
Status: DISCONTINUED | OUTPATIENT
Start: 2025-07-28 | End: 2025-07-29 | Stop reason: HOSPADM

## 2025-07-28 RX ORDER — IBUPROFEN 600 MG/1
600 TABLET, FILM COATED ORAL EVERY 6 HOURS SCHEDULED
Status: DISCONTINUED | OUTPATIENT
Start: 2025-07-28 | End: 2025-07-29 | Stop reason: HOSPADM

## 2025-07-28 RX ORDER — ONDANSETRON HYDROCHLORIDE 2 MG/ML
4 INJECTION, SOLUTION INTRAVENOUS EVERY 6 HOURS PRN
Status: DISCONTINUED | OUTPATIENT
Start: 2025-07-28 | End: 2025-07-29 | Stop reason: HOSPADM

## 2025-07-28 RX ORDER — CARBOPROST TROMETHAMINE 250 UG/ML
250 INJECTION, SOLUTION INTRAMUSCULAR ONCE AS NEEDED
Status: DISCONTINUED | OUTPATIENT
Start: 2025-07-28 | End: 2025-07-29 | Stop reason: HOSPADM

## 2025-07-28 RX ORDER — ACETAMINOPHEN 325 MG/1
975 TABLET ORAL ONCE
Status: COMPLETED | OUTPATIENT
Start: 2025-07-28 | End: 2025-07-28

## 2025-07-28 RX ORDER — ONDANSETRON 4 MG/1
4 TABLET, FILM COATED ORAL EVERY 6 HOURS PRN
Status: DISCONTINUED | OUTPATIENT
Start: 2025-07-28 | End: 2025-07-29 | Stop reason: HOSPADM

## 2025-07-28 RX ORDER — ENOXAPARIN SODIUM 100 MG/ML
60 INJECTION SUBCUTANEOUS EVERY 24 HOURS
Status: DISCONTINUED | OUTPATIENT
Start: 2025-07-29 | End: 2025-07-29 | Stop reason: HOSPADM

## 2025-07-28 RX ORDER — HYDRALAZINE HYDROCHLORIDE 20 MG/ML
5 INJECTION INTRAMUSCULAR; INTRAVENOUS ONCE AS NEEDED
Status: DISCONTINUED | OUTPATIENT
Start: 2025-07-28 | End: 2025-07-29 | Stop reason: HOSPADM

## 2025-07-28 RX ORDER — KETOROLAC TROMETHAMINE 30 MG/ML
INJECTION, SOLUTION INTRAMUSCULAR; INTRAVENOUS
Status: DISPENSED
Start: 2025-07-28 | End: 2025-07-28

## 2025-07-28 RX ORDER — BISACODYL 10 MG/1
10 SUPPOSITORY RECTAL DAILY PRN
Status: DISCONTINUED | OUTPATIENT
Start: 2025-07-28 | End: 2025-07-29 | Stop reason: HOSPADM

## 2025-07-28 RX ORDER — TRANEXAMIC ACID 1 G/10ML
1000 INJECTION, SOLUTION INTRAVENOUS ONCE AS NEEDED
Status: DISCONTINUED | OUTPATIENT
Start: 2025-07-28 | End: 2025-07-29 | Stop reason: HOSPADM

## 2025-07-28 RX ORDER — POLYETHYLENE GLYCOL 3350 17 G/17G
17 POWDER, FOR SOLUTION ORAL 2 TIMES DAILY PRN
Status: DISCONTINUED | OUTPATIENT
Start: 2025-07-28 | End: 2025-07-29 | Stop reason: HOSPADM

## 2025-07-28 RX ORDER — LOPERAMIDE HYDROCHLORIDE 2 MG/1
4 CAPSULE ORAL EVERY 2 HOUR PRN
Status: DISCONTINUED | OUTPATIENT
Start: 2025-07-28 | End: 2025-07-29 | Stop reason: HOSPADM

## 2025-07-28 RX ORDER — METHYLERGONOVINE MALEATE 0.2 MG/ML
0.2 INJECTION INTRAVENOUS ONCE AS NEEDED
Status: DISCONTINUED | OUTPATIENT
Start: 2025-07-28 | End: 2025-07-29 | Stop reason: HOSPADM

## 2025-07-28 RX ORDER — NIFEDIPINE 10 MG/1
10 CAPSULE ORAL ONCE AS NEEDED
Status: DISCONTINUED | OUTPATIENT
Start: 2025-07-28 | End: 2025-07-29 | Stop reason: HOSPADM

## 2025-07-28 RX ORDER — MISOPROSTOL 200 UG/1
800 TABLET ORAL ONCE AS NEEDED
Status: DISCONTINUED | OUTPATIENT
Start: 2025-07-28 | End: 2025-07-29 | Stop reason: HOSPADM

## 2025-07-28 RX ORDER — IBUPROFEN 600 MG/1
600 TABLET, FILM COATED ORAL EVERY 6 HOURS PRN
Qty: 60 TABLET | Refills: 1 | Status: SHIPPED | OUTPATIENT
Start: 2025-07-28

## 2025-07-28 RX ORDER — OXYTOCIN 10 [USP'U]/ML
10 INJECTION, SOLUTION INTRAMUSCULAR; INTRAVENOUS ONCE AS NEEDED
Status: DISCONTINUED | OUTPATIENT
Start: 2025-07-28 | End: 2025-07-29 | Stop reason: HOSPADM

## 2025-07-28 RX ORDER — ADHESIVE BANDAGE
10 BANDAGE TOPICAL
Status: DISCONTINUED | OUTPATIENT
Start: 2025-07-28 | End: 2025-07-29 | Stop reason: HOSPADM

## 2025-07-28 RX ORDER — LABETALOL HYDROCHLORIDE 5 MG/ML
20 INJECTION, SOLUTION INTRAVENOUS ONCE AS NEEDED
Status: DISCONTINUED | OUTPATIENT
Start: 2025-07-28 | End: 2025-07-29 | Stop reason: HOSPADM

## 2025-07-28 RX ORDER — ACETAMINOPHEN 650 MG/1
650 SUPPOSITORY RECTAL ONCE
Status: COMPLETED | OUTPATIENT
Start: 2025-07-28 | End: 2025-07-28

## 2025-07-28 RX ORDER — AMOXICILLIN 250 MG
2 CAPSULE ORAL NIGHTLY PRN
Status: DISCONTINUED | OUTPATIENT
Start: 2025-07-28 | End: 2025-07-29 | Stop reason: HOSPADM

## 2025-07-28 RX ORDER — ACETAMINOPHEN 325 MG/1
975 TABLET ORAL EVERY 6 HOURS PRN
Qty: 60 TABLET | Refills: 1 | Status: SHIPPED | OUTPATIENT
Start: 2025-07-28

## 2025-07-28 RX ADMIN — ACETAMINOPHEN 975 MG: 325 TABLET ORAL at 04:34

## 2025-07-28 RX ADMIN — IBUPROFEN 600 MG: 600 TABLET ORAL at 22:45

## 2025-07-28 RX ADMIN — ACETAMINOPHEN 975 MG: 325 TABLET ORAL at 10:57

## 2025-07-28 RX ADMIN — ACETAMINOPHEN 975 MG: 325 TABLET ORAL at 16:16

## 2025-07-28 RX ADMIN — IBUPROFEN 600 MG: 600 TABLET ORAL at 10:58

## 2025-07-28 RX ADMIN — OXYTOCIN 10 UNITS: 10 INJECTION INTRAVENOUS at 03:40

## 2025-07-28 RX ADMIN — IBUPROFEN 600 MG: 600 TABLET ORAL at 16:16

## 2025-07-28 RX ADMIN — ACETAMINOPHEN 975 MG: 325 TABLET ORAL at 22:45

## 2025-07-28 RX ADMIN — KETOROLAC TROMETHAMINE 30 MG: 30 INJECTION, SOLUTION INTRAMUSCULAR; INTRAVENOUS at 03:45

## 2025-07-28 SDOH — HEALTH STABILITY: MENTAL HEALTH: SUICIDAL BEHAVIOR (LIFETIME): NO

## 2025-07-28 SDOH — SOCIAL STABILITY: SOCIAL INSECURITY: HAS ANYONE EVER THREATENED TO HURT YOUR FAMILY OR YOUR PETS?: NO

## 2025-07-28 SDOH — SOCIAL STABILITY: SOCIAL INSECURITY: DO YOU FEEL ANYONE HAS EXPLOITED OR TAKEN ADVANTAGE OF YOU FINANCIALLY OR OF YOUR PERSONAL PROPERTY?: NO

## 2025-07-28 SDOH — SOCIAL STABILITY: SOCIAL INSECURITY: PHYSICAL ABUSE: DENIES

## 2025-07-28 SDOH — HEALTH STABILITY: MENTAL HEALTH: WISH TO BE DEAD (PAST 1 MONTH): NO

## 2025-07-28 SDOH — HEALTH STABILITY: MENTAL HEALTH: HAVE YOU USED ANY PRESCRIPTION DRUGS OTHER THAN PRESCRIBED IN THE PAST 12 MONTHS?: NO

## 2025-07-28 SDOH — ECONOMIC STABILITY: HOUSING INSECURITY: DO YOU FEEL UNSAFE GOING BACK TO THE PLACE WHERE YOU ARE LIVING?: NO

## 2025-07-28 SDOH — SOCIAL STABILITY: SOCIAL INSECURITY: ARE THERE ANY APPARENT SIGNS OF INJURIES/BEHAVIORS THAT COULD BE RELATED TO ABUSE/NEGLECT?: NO

## 2025-07-28 SDOH — HEALTH STABILITY: MENTAL HEALTH: NON-SPECIFIC ACTIVE SUICIDAL THOUGHTS (PAST 1 MONTH): NO

## 2025-07-28 SDOH — HEALTH STABILITY: MENTAL HEALTH: HAVE YOU USED ANY SUBSTANCES (CANABIS, COCAINE, HEROIN, HALLUCINOGENS, INHALANTS, ETC.) IN THE PAST 12 MONTHS?: NO

## 2025-07-28 SDOH — SOCIAL STABILITY: SOCIAL INSECURITY: DOES ANYONE TRY TO KEEP YOU FROM HAVING/CONTACTING OTHER FRIENDS OR DOING THINGS OUTSIDE YOUR HOME?: NO

## 2025-07-28 SDOH — HEALTH STABILITY: MENTAL HEALTH: WERE YOU ABLE TO COMPLETE ALL THE BEHAVIORAL HEALTH SCREENINGS?: YES

## 2025-07-28 SDOH — SOCIAL STABILITY: SOCIAL INSECURITY: ABUSE SCREEN: ADULT

## 2025-07-28 SDOH — SOCIAL STABILITY: SOCIAL INSECURITY: HAVE YOU HAD ANY THOUGHTS OF HARMING ANYONE ELSE?: NO

## 2025-07-28 SDOH — SOCIAL STABILITY: SOCIAL INSECURITY: VERBAL ABUSE: DENIES

## 2025-07-28 SDOH — SOCIAL STABILITY: SOCIAL INSECURITY: ARE YOU OR HAVE YOU BEEN THREATENED OR ABUSED PHYSICALLY, EMOTIONALLY, OR SEXUALLY BY ANYONE?: NO

## 2025-07-28 SDOH — SOCIAL STABILITY: SOCIAL INSECURITY: HAVE YOU HAD THOUGHTS OF HARMING ANYONE ELSE?: NO

## 2025-07-28 ASSESSMENT — ACTIVITIES OF DAILY LIVING (ADL)
HEARING - LEFT EAR: FUNCTIONAL
DRESSING YOURSELF: INDEPENDENT
TOILETING: INDEPENDENT
ADEQUATE_TO_COMPLETE_ADL: YES
PATIENT'S MEMORY ADEQUATE TO SAFELY COMPLETE DAILY ACTIVITIES?: YES
JUDGMENT_ADEQUATE_SAFELY_COMPLETE_DAILY_ACTIVITIES: YES
WALKS IN HOME: INDEPENDENT
LACK_OF_TRANSPORTATION: NO
HEARING - RIGHT EAR: FUNCTIONAL
FEEDING YOURSELF: INDEPENDENT
BATHING: INDEPENDENT
GROOMING: INDEPENDENT

## 2025-07-28 ASSESSMENT — PATIENT HEALTH QUESTIONNAIRE - PHQ9: 2. FEELING DOWN, DEPRESSED OR HOPELESS: NOT AT ALL

## 2025-07-28 ASSESSMENT — PAIN SCALES - GENERAL
PAINLEVEL_OUTOF10: 0 - NO PAIN
PAINLEVEL_OUTOF10: 3
PAINLEVEL_OUTOF10: 0 - NO PAIN

## 2025-07-28 ASSESSMENT — LIFESTYLE VARIABLES
SKIP TO QUESTIONS 9-10: 1
AUDIT-C TOTAL SCORE: 0
HOW OFTEN DO YOU HAVE 6 OR MORE DRINKS ON ONE OCCASION: NEVER
AUDIT-C TOTAL SCORE: 0
HOW OFTEN DO YOU HAVE A DRINK CONTAINING ALCOHOL: NEVER
HOW MANY STANDARD DRINKS CONTAINING ALCOHOL DO YOU HAVE ON A TYPICAL DAY: PATIENT DOES NOT DRINK

## 2025-07-28 ASSESSMENT — PAIN DESCRIPTION - DESCRIPTORS: DESCRIPTORS: CRAMPING

## 2025-07-28 NOTE — PROGRESS NOTES
CNM Update-Stopped in to check on pt. PPD0. Pt states she is feeling well and does not have any needs at this time. Pt. Is normotensive, denies pain, and plans on getting rest today. Encouraged pt. To ambulate when able. Pt states understanding. All questions answered.

## 2025-07-28 NOTE — PROGRESS NOTES
Spiritual Care Visit  Spiritual Care Request    Reason for Visit:        Request Received From:       Focus of Care:            Refer to :          Spiritual Care Assessment    Spiritual Assessment:                      Care Provided:       Sense of Community and or Sikh Affiliation:  Adventist         Addressed Needs/Concerns and/or [place] Through:          Outcome:        Advance Directives:         Spiritual Care Annotation    Annotation:  Purpose of visit was to congratulate mom and baby.  Dad was present.  Mom was engaging and happy about baby #4.  Mom stated she is Adventist and I have updated her medical record. Mom accepted the gift of the baby bible.

## 2025-07-28 NOTE — LACTATION NOTE
Lactation Consultant Note  Lactation Consultation  Reason for Consult: Initial assessment  Consultant Name: Elma Brennan    Maternal Information  Has mother  before?: Yes  How long did the mother previously breastfeed?: Older children 5, 4 and 11 months.  older children for 1 month, pumped until 6 months. Lost supply for 3rd baby at 3 months (became pregnant)  Previous Maternal Breastfeeding Challenges: Low milk supply, Difficult latch, Exclusive pump and bottle fed  Infant to breast within first 2 hours of birth?: Yes  Exclusive Pump and Bottle Feed: No    Maternal Assessment  Breast Assessment:  (Declined assessment)  Nipple Assessment:  (Declined assessment)  Alterations in Nipple Condition:  (Declined assessment)  [place] Assessment:  (Declined assessment)    Infant Assessment  Infant Behavior: Content after feeding, Deep sleep  Infant Assessment:  (Declined assessment)    Feeding Assessment  Nutrition Source: Breastmilk  Feeding Method: Nursing at the breast  Unable to assess infant feeding at this time: Maternal request (Declined assessment)  Feeding Position:  (Declined assessment)  Suck/Feeding:  (Declined assessment)  Latch Assessment:  (Declined assessment)    LATCH TOOL       Breast Pump  Pump: None    Other OB Lactation Tools       Patient Follow-up  Inpatient Lactation Follow-up Needed : No  Outpatient Lactation Follow-up: Recommended  Lactation Professional - OK to Discharge: Yes    Other OB Lactation Documentation  Maternal Risk Factors: Obesity (pre-pregnancy BMI >30)  Infant Risk Factors: Early term birth 37-39 weeks, Other (comment) (Delivered in car on way to hospital.)    Recommendations/Summary  , 38.3 weeks, NCB on @0200. Delivered in car on the way to the hospital. Birthweight 3000g. TCB 1.9@3 hours of life. Mother endorses infant latching well, denies any issues at this time. Stated goal is breastfeeding for 1 year. Declines assessment.     Educated parents on skin to  skin, hand expression, hunger cues and feeding frequency/patterns. Discussed expected output, weight loss <10%, and normal bilirubin. Educated on AAP pacifier recommendations. Reviewed outpatient resources.

## 2025-07-28 NOTE — H&P
OB Admission H&P    Assessment/Plan    Anya De Anda is a 25 y.o.  at 38w3d, MAYELIN: 2025, by Last Menstrual Period, who is admitted after delivery out of hospital. Delivered in car in way to hospital. Labor started at midnight deliverd at 3AM    Plan   -Admit to L&D, consented  -T&S, CBC, and Syphilis    Postpartum  Contraception Plan: none    Pregnancy Problems (from 25 to present)       Problem Noted Diagnosed Resolved    36 weeks gestation of pregnancy ([facility]) 2025 by Nini Corea RN  No    Priority:  Medium       Overview Addendum 7/15/2025  1:13 PM by DEIRDRE Fischer-RAVIN   Desired provider in labor: [x] CNM  [] Physician   [] Either Acceptable  [x] Blood Products: [x] Yes, accepts [] No, needs counseling  [x] Initial BMI: Could not be calculated   [x] Prenatal Labs: Rubella non immune  [x] Cervical Cancer Screening up to date 24 nml  [x] Rh status: O POS   [] Screen for IPV and Substance Use Risk:  [x] Genetic Screening (cfDNA):  declined  [] First Trimester Anatomy Screen (11-13.6 wks): missed   [x] Baby ASA (initiated): Yes  [x] Pregnancy dated by: LMP 24    [x] Anatomy US: (19-20 wks) WNL  [] Federal Sterilization consent signed (if indicated):  [x] 1hr GCT at 24-28wks: 1hr =141, 3hr GTT WNL  [] Rhogam (if indicated):   [] Fetal Surveillance (if indicated): See Obesity  [x] Tdap (27-32 wks, may be given up to 36 wks if initial window missed): 25    [x] Feeding Intentions: Breast  [x] Postpartum Birth control method: Paragard  [x] GBS at 36 - 37 wks: Neg  [x] 39 weeks discussion of IOL vs. Expectant management: IOL 25  [x] Mode of delivery ( anticipated ): SVB         Normal pregnancy in multigravida in third trimester ([facility]) 2025 by Nini Corea RN  No    Priority:  Medium       Obesity affecting pregnancy in third trimester ([facility]) 3/12/2024 by MAKENNA Cavazos  No    Priority:  Medium       Overview Addendum 2025  8:38  PM by MAKENNA Cavazos   Weekly NSTs at 34 weeks --> weekly BPP in place of NST  Growth U/S at 30 and 36 weeks  25 EFW 67%ile  IOL 39w, scheduled 25                   OB History    Para Term  AB Living   4 3 3 0 0 3   SAB IAB Ectopic Multiple Live Births   0 0 0 0 3      # Outcome Date GA Lbr Farooq/2nd Weight Sex Type Anes PTL Lv   4 Current            3 Term 24 39w0d 05:33 / 00:13 4.25 kg F Vag-Spont EPI  ARTURO      Name: Hali De Anda      Apgar1: 9  Apgar5: 9   2 Term 21 38w0d   F Vag-Spont EPI  ARTURO   1 Term 20 38w0d   M Vag-Spont EPI  ARTURO       Surgical History[1]    Social History     Tobacco Use    Smoking status: Never    Smokeless tobacco: Never   Substance Use Topics    Alcohol use: Not Currently       Allergies[2]    Prescriptions Prior to Admission[3]  Objective     Last Vitals  Temp Pulse Resp BP MAP O2 Sat     89   (!) 151/81 106 96 %     Blood Pressures         2025  0347             BP: 151/81                [1]   Past Surgical History:  Procedure Laterality Date    APPENDECTOMY     [2] No Known Allergies  [3]   Medications Prior to Admission   Medication Sig Dispense Refill Last Dose/Taking    citalopram (CeleXA) 20 mg tablet Take 1 tablet (20 mg) by mouth once daily. 30 tablet 2     PNV no.95/ferrous fum/folic ac (PRENATAL ORAL) Take by mouth once daily.

## 2025-07-28 NOTE — CARE PLAN
Problem: Postpartum  Goal: Experiences normal postpartum course  Outcome: Progressing  Flowsheets (Taken 7/28/2025 1951)  Experiences normal postpartum course:   Monitor maternal vital signs   Med administration/monitoring of effect     Problem: Pain - Adult  Goal: Verbalizes/displays adequate comfort level or baseline comfort level  Outcome: Progressing  Flowsheets (Taken 7/28/2025 1803 by Yuki Castro, RN)  Verbalizes/displays adequate comfort level or baseline comfort level: Encourage patient to monitor pain and request assistance     Problem: Safety - Adult  Goal: Free from fall injury  Outcome: Progressing  Flowsheets (Taken 7/28/2025 0603 by Helen Anderson, RN)  Free from fall injury: Instruct family/caregiver on patient safety     Problem: Discharge Planning  Goal: Discharge to home or other facility with appropriate resources  Outcome: Progressing  Flowsheets (Taken 7/28/2025 0603 by Helen Anderson, RN)  Discharge to home or other facility with appropriate resources: Identify barriers to discharge with patient and caregiver   The patient's goals for the shift include rest    The clinical goals for the shift include Return to prepregnancy state

## 2025-07-28 NOTE — L&D DELIVERY NOTE
"Vaginal Delivery Note    Patient Name: Anya De Anda  : 2000  MRN: [id number]  Age: 25 y.o.    /Para:   Gestational Age: 38w3d    Date of Delivery: 2025    Procedure: Normal Spontaneous Vaginal Delivery    Delivery Provider: self (out of hospital car delivery)        Description of Procedure:  Delivery of viable infant under no anesthesia. Immediate clamping was performed. The infant was placed skin to skin. Cord gases were not sent.  Cord blood was collected. Placenta delivered intact and fundus was firm following uterotonics    no Laceration identified and repaired.    Additional Procedures:  None    Findings:   Amniotic fluid  , Male infant in Vertex     presentation, APGARS  ,  .  Birth Weight  .    Complications: None    Quantitative Blood Loss:   Delivery QBL: 0 mL (2025  3:00 AM - 2025  4:06 AM)    Blood products:      Uterotonics/Hemostatic Agent: {uterotonics:91385::\"IV Pitocin 30 units\"}    Specimen:   Placenta  Delivered:    Appearance: Intact  Removal: Expressed    Disposition: discarded    Sponge/Instrument/Needle Counts: The sponge, lap and needle counts were correct.    Patient Disposition: Patient recovering on labor and delivery in stable condition.    Duane De Anda [[id number]]      Labor Events    Labor type: Spontaneous Onset of Labor  Augmentation: None  Complications: None       Placenta    Placenta delivery date/time:   Placenta removal: Expressed  Placenta appearance: Intact  Placenta disposition: discarded       Lacerations    Episiotomy: None  Perineal laceration: None  Other lacerations?: No  Repair suture: None        Delivery    Birth date/time: 2025 03:00:00  Delivery type: Vaginal, Spontaneous  Complications: None       Apgars    Living status:   Apgar Component Scores:  1 min.:  5 min.:  10 min.:  15 min.:  20 min.:    Skin color:         Heart rate:         Reflex irritability:         Muscle tone:         Respiratory effort:       "   Total:                Delivery Providers    Delivering clinician:    Provider Role     Delivery Nurse     Nursery Nurse     Resident

## 2025-07-28 NOTE — CARE PLAN
The patient's goals for the shift include rest    The clinical goals for the shift include Return to prepregnancy state      Problem: Postpartum  Goal: Experiences normal postpartum course  Outcome: Progressing  Flowsheets (Taken 7/28/2025 0603 by Helen Anderson, RN)  Experiences normal postpartum course: Monitor maternal vital signs     Problem: Pain - Adult  Goal: Verbalizes/displays adequate comfort level or baseline comfort level  Outcome: Progressing  Flowsheets (Taken 7/28/2025 1803)  Verbalizes/displays adequate comfort level or baseline comfort level: Encourage patient to monitor pain and request assistance     Problem: Safety - Adult  Goal: Free from fall injury  Outcome: Progressing  Flowsheets (Taken 7/28/2025 0603 by Helen Anderson, RN)  Free from fall injury: Instruct family/caregiver on patient safety     Problem: Discharge Planning  Goal: Discharge to home or other facility with appropriate resources  Outcome: Progressing  Flowsheets (Taken 7/28/2025 0603 by Helen Anderson, RN)  Discharge to home or other facility with appropriate resources: Identify barriers to discharge with patient and caregiver

## 2025-07-28 NOTE — CARE PLAN
The patient's goals for the shift include rest    The clinical goals for the shift include rest      Problem: Vaginal Birth or  Section  Goal: No s/sx of hemorrhage through recovery  Outcome: Met  Flowsheets (Taken 2025)  No s/sx of hemorrhage through recovery: Monitor QBL and vital signs     Problem: Postpartum  Goal: Experiences normal postpartum course  Outcome: Progressing  Flowsheets (Taken 2025)  Experiences normal postpartum course: Monitor maternal vital signs     Problem: Pain - Adult  Goal: Verbalizes/displays adequate comfort level or baseline comfort level  Outcome: Progressing     Problem: Safety - Adult  Goal: Free from fall injury  Outcome: Progressing  Flowsheets (Taken 2025)  Free from fall injury: Instruct family/caregiver on patient safety     Problem: Discharge Planning  Goal: Discharge to home or other facility with appropriate resources  Outcome: Progressing  Flowsheets (Taken 2025)  Discharge to home or other facility with appropriate resources: Identify barriers to discharge with patient and caregiver

## 2025-07-29 VITALS
OXYGEN SATURATION: 97 % | HEART RATE: 80 BPM | BODY MASS INDEX: 46.17 KG/M2 | SYSTOLIC BLOOD PRESSURE: 119 MMHG | WEIGHT: 268.96 LBS | RESPIRATION RATE: 16 BRPM | TEMPERATURE: 97.7 F | DIASTOLIC BLOOD PRESSURE: 76 MMHG

## 2025-07-29 ASSESSMENT — PAIN SCALES - GENERAL: PAINLEVEL_OUTOF10: 0 - NO PAIN

## 2025-07-29 NOTE — DISCHARGE SUMMARY
Discharge Summary    Admission Date: 7/28/2025  Discharge Date: 7/29/2025    Discharge Diagnosis  Vaginal delivery ([facility])    Hospital Course  Delivery Date: 7/28/2025 3:00 AM  Delivery type: Vaginal, Spontaneous   GA at delivery: 38w3d  Outcome: Living  Anesthesia during delivery: None  Intrapartum complications: None  Feeding method: Breastfeeding Status: Yes     Procedures: delivery of placenta, evaluation for obstetrical lacerations  Contraception at discharge: undecided at discharge  Will discuss with primary OB Provider    Pertinent Physical Exam At Time of Discharge    General: Examination reveals a well developed, well nourished and overweight, female, in no acute distress. She is alert and cooperative.  Lungs: unlabored breathing on room air.  Breasts: breasts appear normal for pregnancy, no suspicious masses, no skin or nipple changes or axillary nodes.  Fundus: firm, below umbilicus, and nontender.  Perineum: well healing.  Extremities: no redness or tenderness in the calves or thighs, no edema.  Psychological: awake and alert; oriented to person, place, and time.    Last Vitals:  Temp Pulse Resp BP MAP Pulse Ox   36.6 °C (97.9 °F) 101 18 111/73 85 96 %     Discharge Meds     Your medication list        START taking these medications        Instructions Last Dose Given Next Dose Due   acetaminophen 325 mg tablet  Commonly known as: Tylenol      Take 3 tablets (975 mg) by mouth every 6 hours if needed for mild pain (1 - 3).       ibuprofen 600 mg tablet      Take 1 tablet (600 mg) by mouth every 6 hours if needed for mild pain (1 - 3).              CONTINUE taking these medications        Instructions Last Dose Given Next Dose Due   PRENATAL ORAL                  STOP taking these medications      citalopram 20 mg tablet  Commonly known as: CeleXA                  Where to Get Your Medications        These medications were sent to [facility]/pharmacy #[id number] - [place], [place] - [address]  [address] [facility]  [address]      Phone: [phone]   acetaminophen 325 mg tablet  ibuprofen 600 mg tablet          Complications Requiring Follow-Up  None apparent at discharge    Test Results Pending At Discharge  Pending Labs       No current pending labs.        Post Partum discharge instructions reviewed--advised to call with problems or questions; post-birth warning signs reviewed both verbally and in writing and they verbalize understanding; patient strongly desires to have 24hr discharge if pediatrics allows infant discharge and is prepared to leave in the middle of the night if able; all patient questions answered.    Outpatient Follow-Up  No future appointments.    I spent 30 minutes in the professional and overall care of this patient.      DEIRDRE Gusman-RAVIN

## 2025-07-29 NOTE — NURSING NOTE
0352- Discharge paperwork given on mother and infant. After visit summary printed and given to mother. All questions answered at this time. Baby band verified and cut off infant. Pt. wheeled off unit in stable condition accompanied by FOB. Infant clicked into rear facing car seat.

## 2025-07-31 ENCOUNTER — APPOINTMENT (OUTPATIENT)
Dept: OBSTETRICS AND GYNECOLOGY | Facility: CLINIC | Age: 25
End: 2025-07-31
Payer: COMMERCIAL

## 2025-08-01 ENCOUNTER — APPOINTMENT (OUTPATIENT)
Dept: RADIOLOGY | Facility: CLINIC | Age: 25
End: 2025-08-01
Payer: COMMERCIAL

## 2025-08-07 ENCOUNTER — APPOINTMENT (OUTPATIENT)
Dept: OBSTETRICS AND GYNECOLOGY | Facility: CLINIC | Age: 25
End: 2025-08-07
Payer: COMMERCIAL

## 2025-09-11 ENCOUNTER — APPOINTMENT (OUTPATIENT)
Dept: OBSTETRICS AND GYNECOLOGY | Facility: CLINIC | Age: 25
End: 2025-09-11
Payer: COMMERCIAL